# Patient Record
Sex: MALE | Race: WHITE | NOT HISPANIC OR LATINO | URBAN - METROPOLITAN AREA
[De-identification: names, ages, dates, MRNs, and addresses within clinical notes are randomized per-mention and may not be internally consistent; named-entity substitution may affect disease eponyms.]

---

## 2018-04-25 ENCOUNTER — EMERGENCY (EMERGENCY)
Facility: HOSPITAL | Age: 43
LOS: 1 days | Discharge: HOME | End: 2018-04-25
Attending: EMERGENCY MEDICINE
Payer: COMMERCIAL

## 2018-04-25 VITALS — HEIGHT: 74 IN | WEIGHT: 229.94 LBS

## 2018-04-25 VITALS
DIASTOLIC BLOOD PRESSURE: 90 MMHG | OXYGEN SATURATION: 96 % | RESPIRATION RATE: 16 BRPM | TEMPERATURE: 98 F | HEART RATE: 74 BPM | SYSTOLIC BLOOD PRESSURE: 171 MMHG

## 2018-04-25 DIAGNOSIS — Z98.890 OTHER SPECIFIED POSTPROCEDURAL STATES: Chronic | ICD-10-CM

## 2018-04-25 PROCEDURE — 72131 CT LUMBAR SPINE W/O DYE: CPT

## 2018-04-25 PROCEDURE — 99284 EMERGENCY DEPT VISIT MOD MDM: CPT

## 2018-04-25 PROCEDURE — 96372 THER/PROPH/DIAG INJ SC/IM: CPT

## 2018-04-25 PROCEDURE — 99284 EMERGENCY DEPT VISIT MOD MDM: CPT | Mod: 25

## 2018-04-25 PROCEDURE — 72131 CT LUMBAR SPINE W/O DYE: CPT | Mod: 26

## 2018-04-25 RX ORDER — METHOCARBAMOL 500 MG/1
2 TABLET, FILM COATED ORAL
Qty: 30 | Refills: 0 | OUTPATIENT
Start: 2018-04-25 | End: 2018-04-29

## 2018-04-25 RX ORDER — DIAZEPAM 5 MG
10 TABLET ORAL ONCE
Qty: 0 | Refills: 0 | Status: DISCONTINUED | OUTPATIENT
Start: 2018-04-25 | End: 2018-04-25

## 2018-04-25 RX ORDER — KETOROLAC TROMETHAMINE 30 MG/ML
30 SYRINGE (ML) INJECTION ONCE
Qty: 0 | Refills: 0 | Status: DISCONTINUED | OUTPATIENT
Start: 2018-04-25 | End: 2018-04-25

## 2018-04-25 RX ORDER — IBUPROFEN 200 MG
600 TABLET ORAL ONCE
Qty: 0 | Refills: 0 | Status: COMPLETED | OUTPATIENT
Start: 2018-04-25 | End: 2018-04-25

## 2018-04-25 RX ORDER — METHOCARBAMOL 500 MG/1
1500 TABLET, FILM COATED ORAL ONCE
Qty: 0 | Refills: 0 | Status: COMPLETED | OUTPATIENT
Start: 2018-04-25 | End: 2018-04-25

## 2018-04-25 RX ORDER — IBUPROFEN 200 MG
1 TABLET ORAL
Qty: 28 | Refills: 0 | OUTPATIENT
Start: 2018-04-25 | End: 2018-05-01

## 2018-04-25 RX ADMIN — METHOCARBAMOL 1500 MILLIGRAM(S): 500 TABLET, FILM COATED ORAL at 20:57

## 2018-04-25 RX ADMIN — Medication 600 MILLIGRAM(S): at 21:20

## 2018-04-25 RX ADMIN — Medication 30 MILLIGRAM(S): at 16:21

## 2018-04-25 RX ADMIN — Medication 600 MILLIGRAM(S): at 20:57

## 2018-04-25 RX ADMIN — Medication 10 MILLIGRAM(S): at 15:14

## 2018-04-25 RX ADMIN — Medication 30 MILLIGRAM(S): at 16:01

## 2018-04-25 NOTE — ED ADULT NURSE NOTE - PMH
Carcinoid tumor of abdomen    Chronic diarrhea    Inguinal hernia    Kidney stones    Ventral hernia

## 2018-04-25 NOTE — ED ADULT NURSE NOTE - OBJECTIVE STATEMENT
pt presents to ED with lower back pain s/p slip and fall yesterday. pt states he had PET scan yesterday which laying flat aggregated pain. pt ambulates with pain,. pt states it is painful to sit down. a&ox3. denies hitting his head. denies LOC.

## 2018-04-30 NOTE — ED PROVIDER NOTE - NSCAREINITIATED _GEN_ER
Pt ambulated around department using ambulatory pulse ox  Pt O2 saturation remained 94-96% on room air        Alana Shukla RN  03/22/18 [id number] Christopher Bautista(Attending)

## 2018-04-30 NOTE — ED PROVIDER NOTE - OBJECTIVE STATEMENT
41 yo male pmh carcinoid tumor comes to ed with lower back pain after a fall 4 days ago ; pt noted having recent mri pet scan for cancer; however he did not receive results; pt noted continued pain in lower back; denies urinary for fecal incontinence

## 2018-05-01 ENCOUNTER — OUTPATIENT (OUTPATIENT)
Dept: OUTPATIENT SERVICES | Facility: HOSPITAL | Age: 43
LOS: 1 days | End: 2018-05-01
Payer: MEDICAID

## 2018-05-01 DIAGNOSIS — Z98.890 OTHER SPECIFIED POSTPROCEDURAL STATES: Chronic | ICD-10-CM

## 2018-05-01 PROCEDURE — G9001: CPT

## 2018-05-09 DIAGNOSIS — R69 ILLNESS, UNSPECIFIED: ICD-10-CM

## 2018-10-05 ENCOUNTER — OUTPATIENT (OUTPATIENT)
Dept: OUTPATIENT SERVICES | Facility: HOSPITAL | Age: 43
LOS: 1 days | End: 2018-10-05

## 2018-10-05 DIAGNOSIS — Z98.890 OTHER SPECIFIED POSTPROCEDURAL STATES: Chronic | ICD-10-CM

## 2018-10-15 ENCOUNTER — OUTPATIENT (OUTPATIENT)
Dept: OUTPATIENT SERVICES | Facility: HOSPITAL | Age: 43
LOS: 1 days | End: 2018-10-15

## 2018-10-15 DIAGNOSIS — Z98.890 OTHER SPECIFIED POSTPROCEDURAL STATES: Chronic | ICD-10-CM

## 2018-10-20 ENCOUNTER — EMERGENCY (EMERGENCY)
Facility: HOSPITAL | Age: 43
LOS: 1 days | End: 2018-10-20
Payer: MEDICAID

## 2018-10-20 DIAGNOSIS — Z98.890 OTHER SPECIFIED POSTPROCEDURAL STATES: Chronic | ICD-10-CM

## 2018-10-20 PROCEDURE — 74019 RADEX ABDOMEN 2 VIEWS: CPT | Mod: 26

## 2018-10-20 PROCEDURE — 99285 EMERGENCY DEPT VISIT HI MDM: CPT

## 2018-10-20 PROCEDURE — 71046 X-RAY EXAM CHEST 2 VIEWS: CPT | Mod: 26

## 2018-10-21 PROCEDURE — 74177 CT ABD & PELVIS W/CONTRAST: CPT | Mod: 26

## 2018-10-21 PROCEDURE — 74018 RADEX ABDOMEN 1 VIEW: CPT | Mod: 26

## 2020-10-14 ENCOUNTER — APPOINTMENT (OUTPATIENT)
Dept: FAMILY MEDICINE | Facility: CLINIC | Age: 45
End: 2020-10-14
Payer: MEDICAID

## 2020-10-14 ENCOUNTER — NON-APPOINTMENT (OUTPATIENT)
Age: 45
End: 2020-10-14

## 2020-10-14 VITALS
BODY MASS INDEX: 30.93 KG/M2 | TEMPERATURE: 98.2 F | HEART RATE: 58 BPM | OXYGEN SATURATION: 99 % | DIASTOLIC BLOOD PRESSURE: 80 MMHG | WEIGHT: 241 LBS | SYSTOLIC BLOOD PRESSURE: 126 MMHG | HEIGHT: 74 IN

## 2020-10-14 DIAGNOSIS — Z80.8 FAMILY HISTORY OF MALIGNANT NEOPLASM OF OTHER ORGANS OR SYSTEMS: ICD-10-CM

## 2020-10-14 DIAGNOSIS — Z87.891 PERSONAL HISTORY OF NICOTINE DEPENDENCE: ICD-10-CM

## 2020-10-14 DIAGNOSIS — Z80.0 FAMILY HISTORY OF MALIGNANT NEOPLASM OF DIGESTIVE ORGANS: ICD-10-CM

## 2020-10-14 DIAGNOSIS — Z00.00 ENCOUNTER FOR GENERAL ADULT MEDICAL EXAMINATION W/OUT ABNORMAL FINDINGS: ICD-10-CM

## 2020-10-14 DIAGNOSIS — Z78.9 OTHER SPECIFIED HEALTH STATUS: ICD-10-CM

## 2020-10-14 PROCEDURE — 93000 ELECTROCARDIOGRAM COMPLETE: CPT

## 2020-10-14 PROCEDURE — 36415 COLL VENOUS BLD VENIPUNCTURE: CPT

## 2020-10-14 PROCEDURE — 99204 OFFICE O/P NEW MOD 45 MIN: CPT | Mod: 25

## 2020-10-14 NOTE — PLAN
[FreeTextEntry1] : 1. Labs\par 2. Referrals for pain management Fans and PMNR Del Cid\par 3 Follow up plan pending results of labs

## 2020-10-14 NOTE — PHYSICAL EXAM
[No Acute Distress] : no acute distress [Well Nourished] : well nourished [Well Developed] : well developed [Normal Sclera/Conjunctiva] : normal sclera/conjunctiva [EOMI] : extraocular movements intact [Normal Outer Ear/Nose] : the outer ears and nose were normal in appearance [No Lymphadenopathy] : no lymphadenopathy [No Respiratory Distress] : no respiratory distress  [Supple] : supple [Clear to Auscultation] : lungs were clear to auscultation bilaterally [No Accessory Muscle Use] : no accessory muscle use [Regular Rhythm] : with a regular rhythm [Normal Rate] : normal rate  [Normal S1, S2] : normal S1 and S2 [Pedal Pulses Present] : the pedal pulses are present [No Edema] : there was no peripheral edema [Soft] : abdomen soft [Non-distended] : non-distended [Non Tender] : non-tender [No HSM] : no HSM [No Masses] : no abdominal mass palpated [No CVA Tenderness] : no CVA  tenderness [Normal Bowel Sounds] : normal bowel sounds [No Spinal Tenderness] : no spinal tenderness [No Joint Swelling] : no joint swelling [Grossly Normal Strength/Tone] : grossly normal strength/tone [Coordination Grossly Intact] : coordination grossly intact [No Focal Deficits] : no focal deficits [Normal Gait] : normal gait [Normal Insight/Judgement] : insight and judgment were intact [Normal Affect] : the affect was normal [de-identified] : larger vertical scar [de-identified] : left axilla with multiple skin tags

## 2020-10-14 NOTE — HEALTH RISK ASSESSMENT
[] : Yes [16-20] : 16-20 [No] : No [1 or 2 (0 pts)] : 1 or 2 (0 points) [Never (0 pts)] : Never (0 points) [Yes] : In the past 12 months have you used drugs other than those required for medical reasons? Yes [No falls in past year] : Patient reported no falls in the past year [0] : 2) Feeling down, depressed, or hopeless: Not at all (0) [No Retinopathy] : No retinopathy [With Family] : lives with family [Significant Other] : lives with significant other [# Of Children ___] : has [unfilled] children [Fully functional (bathing, dressing, toileting, transferring, walking, feeding)] : Fully functional (bathing, dressing, toileting, transferring, walking, feeding) [Fully functional (using the telephone, shopping, preparing meals, housekeeping, doing laundry, using] : Fully functional and needs no help or supervision to perform IADLs (using the telephone, shopping, preparing meals, housekeeping, doing laundry, using transportation, managing medications and managing finances) [With Patient/Caregiver] : With Patient/Caregiver [Name: ___] : Health Care Proxy's Name: [unfilled]  [Relationship: ___] : Relationship: [unfilled] [de-identified] : Currently uses Vibe pen [de-identified] : Oncology [FreeTextEntry1] : Pain [EyeExamDate] : 10/2019 [de-identified] : No beef, pork, sugar; drinks only water [de-identified] : Marijuana; edibles [ColonoscopyDate] : 01/2017 [ColonoscopyComments] : Patient to f/u every 6 months [AdvancecareDate] : 10/2020

## 2020-10-16 PROBLEM — Z00.00 ENCOUNTER FOR PREVENTIVE HEALTH EXAMINATION: Status: ACTIVE | Noted: 2020-09-22

## 2020-10-26 LAB
ALBUMIN SERPL ELPH-MCNC: 4.9 G/DL
ALP BLD-CCNC: 77 U/L
ALT SERPL-CCNC: 86 U/L
ANION GAP SERPL CALC-SCNC: 11 MMOL/L
AST SERPL-CCNC: 37 U/L
BASOPHILS # BLD AUTO: 0.03 K/UL
BASOPHILS NFR BLD AUTO: 0.5 %
BILIRUB SERPL-MCNC: 0.6 MG/DL
BUN SERPL-MCNC: 16 MG/DL
CALCIUM SERPL-MCNC: 10.2 MG/DL
CHLORIDE SERPL-SCNC: 104 MMOL/L
CHOLEST SERPL-MCNC: 146 MG/DL
CHOLEST/HDLC SERPL: 3.6 RATIO
CO2 SERPL-SCNC: 23 MMOL/L
CREAT SERPL-MCNC: 0.96 MG/DL
EOSINOPHIL # BLD AUTO: 0.08 K/UL
EOSINOPHIL NFR BLD AUTO: 1.3 %
ESTIMATED AVERAGE GLUCOSE: 120 MG/DL
GLUCOSE SERPL-MCNC: 102 MG/DL
HBA1C MFR BLD HPLC: 5.8 %
HCT VFR BLD CALC: 49.3 %
HDLC SERPL-MCNC: 41 MG/DL
HGB BLD-MCNC: 16.1 G/DL
IMM GRANULOCYTES NFR BLD AUTO: 0.8 %
LDLC SERPL CALC-MCNC: 45 MG/DL
LYMPHOCYTES # BLD AUTO: 1.95 K/UL
LYMPHOCYTES NFR BLD AUTO: 32.2 %
MAN DIFF?: NORMAL
MCHC RBC-ENTMCNC: 29.3 PG
MCHC RBC-ENTMCNC: 32.7 GM/DL
MCV RBC AUTO: 89.8 FL
MONOCYTES # BLD AUTO: 0.61 K/UL
MONOCYTES NFR BLD AUTO: 10.1 %
NEUTROPHILS # BLD AUTO: 3.34 K/UL
NEUTROPHILS NFR BLD AUTO: 55.1 %
PLATELET # BLD AUTO: 204 K/UL
POTASSIUM SERPL-SCNC: 4.7 MMOL/L
PROT SERPL-MCNC: 7.5 G/DL
RBC # BLD: 5.49 M/UL
RBC # FLD: 12.6 %
SODIUM SERPL-SCNC: 138 MMOL/L
TRIGL SERPL-MCNC: 302 MG/DL
TSH SERPL-ACNC: 2.44 UIU/ML
WBC # FLD AUTO: 6.06 K/UL

## 2020-12-30 ENCOUNTER — APPOINTMENT (OUTPATIENT)
Dept: DERMATOLOGY | Facility: CLINIC | Age: 45
End: 2020-12-30
Payer: MEDICAID

## 2020-12-30 VITALS — WEIGHT: 240 LBS | HEIGHT: 74 IN | BODY MASS INDEX: 30.8 KG/M2

## 2020-12-30 DIAGNOSIS — L72.0 EPIDERMAL CYST: ICD-10-CM

## 2020-12-30 PROCEDURE — 99202 OFFICE O/P NEW SF 15 MIN: CPT

## 2020-12-30 PROCEDURE — 99072 ADDL SUPL MATRL&STAF TM PHE: CPT

## 2020-12-30 NOTE — HISTORY OF PRESENT ILLNESS
[FreeTextEntry1] : Growth on left eyelid [de-identified] : First visit for residual white male residing with a tender lesion near the left eye.\par Note: Patient has history of metastatic carcinoid- currently being treated at [facility].

## 2020-12-30 NOTE — PHYSICAL EXAM
[Alert] : alert [Oriented x 3] : ~L oriented x 3 [Well Nourished] : well nourished [FreeTextEntry3] : Type II skin\par \par Left upper outer eyelid, 2 mm white papule

## 2021-01-21 ENCOUNTER — APPOINTMENT (OUTPATIENT)
Dept: DERMATOLOGY | Facility: CLINIC | Age: 46
End: 2021-01-21

## 2021-02-27 ENCOUNTER — TRANSCRIPTION ENCOUNTER (OUTPATIENT)
Age: 46
End: 2021-02-27

## 2021-03-01 ENCOUNTER — APPOINTMENT (OUTPATIENT)
Dept: FAMILY MEDICINE | Facility: CLINIC | Age: 46
End: 2021-03-01
Payer: MEDICAID

## 2021-03-01 DIAGNOSIS — Z13.6 ENCOUNTER FOR SCREENING FOR CARDIOVASCULAR DISORDERS: ICD-10-CM

## 2021-03-01 DIAGNOSIS — U07.1 COVID-19: ICD-10-CM

## 2021-03-01 DIAGNOSIS — Z76.89 PERSONS ENCOUNTERING HEALTH SERVICES IN OTHER SPECIFIED CIRCUMSTANCES: ICD-10-CM

## 2021-03-01 PROCEDURE — 99213 OFFICE O/P EST LOW 20 MIN: CPT | Mod: 95

## 2021-03-01 NOTE — PHYSICAL EXAM
[de-identified] : Telehealth precludes traditional, comprehensive physical exam. Patient appeared stable and alert.

## 2021-03-01 NOTE — HISTORY OF PRESENT ILLNESS
[Home] : at home, [unfilled] , at the time of the visit. [Medical Office: ([facility])___] : at the medical office located in  [Verbal consent obtained from patient] : the patient, [unfilled] [FreeTextEntry8] : Mr. MCKENZIE WAN is a 45 year male who presents for telehealth appointment with + Covid-19. He  had been evaluated at Urgent Care 2/27/21 and rapid test +. Pt states symptoms are subsiding, had fever, body ache, chills and decreased appetite.  Pt now with c/o tickle to throat and occasional dry cough. Son is sick with Covid-19 as well. Patient would like to discuss zinc supplement and other measures for Covid-19 treatment. Patient also enquiring when he can get another covid pcr to travel to [place].  He has been having occasional LLQ pain, 4/10 on pain scale, lasting 20 seconds, no associated GI symptoms.  He does have LUTS likely from BPH.

## 2021-03-25 ENCOUNTER — APPOINTMENT (OUTPATIENT)
Dept: FAMILY MEDICINE | Facility: CLINIC | Age: 46
End: 2021-03-25
Payer: MEDICAID

## 2021-03-25 VITALS
DIASTOLIC BLOOD PRESSURE: 84 MMHG | OXYGEN SATURATION: 98 % | BODY MASS INDEX: 31.36 KG/M2 | HEART RATE: 68 BPM | WEIGHT: 244.38 LBS | TEMPERATURE: 96.4 F | SYSTOLIC BLOOD PRESSURE: 138 MMHG | HEIGHT: 74 IN

## 2021-03-25 DIAGNOSIS — M25.562 PAIN IN RIGHT KNEE: ICD-10-CM

## 2021-03-25 DIAGNOSIS — E78.1 PURE HYPERGLYCERIDEMIA: ICD-10-CM

## 2021-03-25 DIAGNOSIS — R74.8 ABNORMAL LEVELS OF OTHER SERUM ENZYMES: ICD-10-CM

## 2021-03-25 DIAGNOSIS — M25.561 PAIN IN RIGHT KNEE: ICD-10-CM

## 2021-03-25 DIAGNOSIS — G89.29 PAIN IN RIGHT KNEE: ICD-10-CM

## 2021-03-25 PROCEDURE — 99072 ADDL SUPL MATRL&STAF TM PHE: CPT

## 2021-03-25 PROCEDURE — 99214 OFFICE O/P EST MOD 30 MIN: CPT

## 2021-03-26 NOTE — HISTORY OF PRESENT ILLNESS
[FreeTextEntry1] : knee pain [de-identified] : Mr. MCKENZIE WAN is a 45 year male who presents to office following up on chronic, bilateral knee pain, worsening.\par Patient also had trauma to his right hand when he was younger and is having increasing difficulty with grasping, especially due to right ring and pinky finger, and wrist discomfort.\par He was also meant to have a dental exam last night but did not make an apt due to a loss in his family.\par He also reports right-sided back/flank pain.\par Patient is currently being treated with hormone therapy for carcinoid tumor. He complains of body aches and pains.

## 2021-03-26 NOTE — PHYSICAL EXAM
[Normal] : no acute distress, well nourished, well developed and well-appearing [Normal Sclera/Conjunctiva] : normal sclera/conjunctiva [Normal Outer Ear/Nose] : the outer ears and nose were normal in appearance [Supple] : supple [Normal Gait] : normal gait [Normal Affect] : the affect was normal [de-identified] : lumbar back with paraspinal tenderness

## 2021-03-30 ENCOUNTER — APPOINTMENT (OUTPATIENT)
Dept: FAMILY MEDICINE | Facility: CLINIC | Age: 46
End: 2021-03-30

## 2021-03-30 DIAGNOSIS — R41.3 OTHER AMNESIA: ICD-10-CM

## 2021-04-15 ENCOUNTER — APPOINTMENT (OUTPATIENT)
Dept: ORTHOPEDIC SURGERY | Facility: CLINIC | Age: 46
End: 2021-04-15

## 2021-05-05 ENCOUNTER — LABORATORY RESULT (OUTPATIENT)
Age: 46
End: 2021-05-05

## 2021-05-05 ENCOUNTER — APPOINTMENT (OUTPATIENT)
Dept: FAMILY MEDICINE | Facility: CLINIC | Age: 46
End: 2021-05-05
Payer: MEDICAID

## 2021-05-05 ENCOUNTER — APPOINTMENT (OUTPATIENT)
Dept: ORTHOPEDIC SURGERY | Facility: CLINIC | Age: 46
End: 2021-05-05

## 2021-05-05 VITALS
DIASTOLIC BLOOD PRESSURE: 84 MMHG | HEART RATE: 71 BPM | BODY MASS INDEX: 30.81 KG/M2 | TEMPERATURE: 98 F | SYSTOLIC BLOOD PRESSURE: 130 MMHG | WEIGHT: 240 LBS | OXYGEN SATURATION: 98 %

## 2021-05-05 PROCEDURE — 99213 OFFICE O/P EST LOW 20 MIN: CPT | Mod: 25

## 2021-05-05 PROCEDURE — 36415 COLL VENOUS BLD VENIPUNCTURE: CPT

## 2021-05-05 PROCEDURE — 99072 ADDL SUPL MATRL&STAF TM PHE: CPT

## 2021-05-06 LAB — B BURGDOR IGG+IGM SER QL IB: NORMAL

## 2021-05-06 NOTE — PLAN
[FreeTextEntry1] : Baseline serologic testing, though no antibodies expected as it has been less than 1 week since exposure.

## 2021-05-06 NOTE — HISTORY OF PRESENT ILLNESS
[FreeTextEntry8] : Mr. MCKENZIE WAN is a 45 year male who presents to office c/o a tick bite on neck. Patient reports he noticed the bite about 2 days ago and he estimates tick was attached for at leas 2 days, based on days when he was outside. He removed tick but has significant swelling and redness.  Pt also has a lump on the side of the next that appeared last night.

## 2021-05-14 LAB

## 2021-05-17 ENCOUNTER — APPOINTMENT (OUTPATIENT)
Dept: FAMILY MEDICINE | Facility: CLINIC | Age: 46
End: 2021-05-17

## 2021-05-19 ENCOUNTER — NON-APPOINTMENT (OUTPATIENT)
Age: 46
End: 2021-05-19

## 2021-05-26 ENCOUNTER — APPOINTMENT (OUTPATIENT)
Dept: NEUROLOGY | Facility: CLINIC | Age: 46
End: 2021-05-26

## 2021-06-22 ENCOUNTER — APPOINTMENT (OUTPATIENT)
Dept: FAMILY MEDICINE | Facility: CLINIC | Age: 46
End: 2021-06-22
Payer: MEDICAID

## 2021-06-22 VITALS
HEIGHT: 74 IN | SYSTOLIC BLOOD PRESSURE: 134 MMHG | DIASTOLIC BLOOD PRESSURE: 84 MMHG | WEIGHT: 243 LBS | HEART RATE: 68 BPM | OXYGEN SATURATION: 98 % | BODY MASS INDEX: 31.18 KG/M2

## 2021-06-22 PROCEDURE — 99213 OFFICE O/P EST LOW 20 MIN: CPT

## 2021-06-22 RX ORDER — DOXYCYCLINE HYCLATE 100 MG/1
100 CAPSULE ORAL TWICE DAILY
Qty: 20 | Refills: 0 | Status: DISCONTINUED | COMMUNITY
Start: 2021-05-05 | End: 2021-06-22

## 2021-06-24 NOTE — PHYSICAL EXAM
[Normal] : no acute distress, well nourished, well developed and well-appearing [Normal Sclera/Conjunctiva] : normal sclera/conjunctiva [Normal Outer Ear/Nose] : the outer ears and nose were normal in appearance [Supple] : supple [No Respiratory Distress] : no respiratory distress  [No Accessory Muscle Use] : no accessory muscle use [Normal Rate] : normal rate  [Pedal Pulses Present] : the pedal pulses are present [No Edema] : there was no peripheral edema [No Rash] : no rash [Normal Gait] : normal gait [Normal Affect] : the affect was normal

## 2021-06-24 NOTE — HISTORY OF PRESENT ILLNESS
[FreeTextEntry1] : Pt is following up on chronic pain\par Pt would also like to discuss the COVID vaccine [de-identified] : Mr. MCKENZIE WAN is a 45 year male who presents to office to follow up on chronic lower back pain from liver cancer mets. Patient states cocaine was found in his urine and hence physician will no longer be prescribing him with opioid pain medicines. Patient states he has a few tablets left.

## 2021-06-24 NOTE — PLAN
[FreeTextEntry1] : Discussed with patient, referral to Palliative medicine for comprehensive management of pain and other associated symptoms. Patient agreed. Also educated patient importance of abstaining from recreational drug use or self medication with alcohol.

## 2021-07-22 ENCOUNTER — APPOINTMENT (OUTPATIENT)
Dept: GASTROENTEROLOGY | Facility: CLINIC | Age: 46
End: 2021-07-22

## 2021-08-13 ENCOUNTER — APPOINTMENT (OUTPATIENT)
Dept: FAMILY MEDICINE | Facility: CLINIC | Age: 46
End: 2021-08-13
Payer: MEDICAID

## 2021-08-13 VITALS
WEIGHT: 243 LBS | SYSTOLIC BLOOD PRESSURE: 131 MMHG | HEIGHT: 74 IN | TEMPERATURE: 97.1 F | BODY MASS INDEX: 31.18 KG/M2 | HEART RATE: 61 BPM | DIASTOLIC BLOOD PRESSURE: 86 MMHG

## 2021-08-13 DIAGNOSIS — M79.602 PAIN IN LEFT ARM: ICD-10-CM

## 2021-08-13 DIAGNOSIS — S62.92XA UNSPECIFIED FRACTURE OF LEFT WRIST AND HAND, INITIAL ENCOUNTER FOR CLOSED FRACTURE: ICD-10-CM

## 2021-08-13 PROCEDURE — 99214 OFFICE O/P EST MOD 30 MIN: CPT

## 2021-08-13 NOTE — PHYSICAL EXAM
[No Acute Distress] : no acute distress [Well Nourished] : well nourished [Well Developed] : well developed [Normal] : normal rate, regular rhythm, normal S1 and S2 and no murmur heard [de-identified] : Right hand casted. Pateint able to move his fingers. No alteration to sensation. No tenderness noted in fingers or elbow.

## 2021-08-13 NOTE — ASSESSMENT
[FreeTextEntry1] : \par Left wrist fracture\par Unclear cause but could be related to mets as patient report that has Stage IV cancer. [facility]\par Patient drove from [place] for 16 hrs, he could have had microtrauma.\par Patient didn't bring his hospital records\par Advised regarding arm elevation, to apply ice\par WIll try ibuprofen with meals, sending to his pharmacy\par Referring to hand surgery, advised to bring his records to them\par \par Return to care: for his regular follow ups or earlier if needed\par Call or return for any questions

## 2021-08-18 ENCOUNTER — APPOINTMENT (OUTPATIENT)
Dept: ORTHOPEDIC SURGERY | Facility: CLINIC | Age: 46
End: 2021-08-18
Payer: MEDICAID

## 2021-08-18 VITALS
SYSTOLIC BLOOD PRESSURE: 131 MMHG | DIASTOLIC BLOOD PRESSURE: 84 MMHG | HEIGHT: 74 IN | BODY MASS INDEX: 31.18 KG/M2 | WEIGHT: 243 LBS | HEART RATE: 70 BPM

## 2021-08-18 DIAGNOSIS — M79.642 PAIN IN LEFT HAND: ICD-10-CM

## 2021-08-18 DIAGNOSIS — M19.041 PRIMARY OSTEOARTHRITIS, RIGHT HAND: ICD-10-CM

## 2021-08-18 PROCEDURE — 73130 X-RAY EXAM OF HAND: CPT | Mod: LT

## 2021-08-18 PROCEDURE — 99204 OFFICE O/P NEW MOD 45 MIN: CPT

## 2021-08-19 ENCOUNTER — APPOINTMENT (OUTPATIENT)
Dept: FAMILY MEDICINE | Facility: CLINIC | Age: 46
End: 2021-08-19
Payer: MEDICAID

## 2021-08-19 VITALS
WEIGHT: 240 LBS | HEART RATE: 60 BPM | SYSTOLIC BLOOD PRESSURE: 122 MMHG | OXYGEN SATURATION: 99 % | RESPIRATION RATE: 16 BRPM | BODY MASS INDEX: 30.8 KG/M2 | DIASTOLIC BLOOD PRESSURE: 60 MMHG | HEIGHT: 74 IN

## 2021-08-19 DIAGNOSIS — L91.8 OTHER HYPERTROPHIC DISORDERS OF THE SKIN: ICD-10-CM

## 2021-08-19 PROCEDURE — 99213 OFFICE O/P EST LOW 20 MIN: CPT

## 2021-08-19 RX ORDER — OXYCODONE 5 MG/1
5 TABLET ORAL
Qty: 12 | Refills: 0 | Status: DISCONTINUED | COMMUNITY
Start: 2021-08-11 | End: 2021-08-19

## 2021-08-19 RX ORDER — NAPROXEN 500 MG/1
500 TABLET ORAL
Qty: 30 | Refills: 0 | Status: DISCONTINUED | COMMUNITY
Start: 2021-06-22 | End: 2021-08-19

## 2021-08-19 RX ORDER — IBUPROFEN 600 MG/1
600 TABLET, FILM COATED ORAL 3 TIMES DAILY
Qty: 12 | Refills: 0 | Status: DISCONTINUED | COMMUNITY
Start: 2021-08-13 | End: 2021-08-19

## 2021-08-19 RX ORDER — DICLOFENAC SODIUM 1% 10 MG/G
1 GEL TOPICAL DAILY
Qty: 1 | Refills: 0 | Status: DISCONTINUED | COMMUNITY
Start: 2021-06-22 | End: 2021-08-19

## 2021-08-19 NOTE — HISTORY OF PRESENT ILLNESS
[FreeTextEntry1] : pt f/u for stage 4 liver cancer. pt is concerned about skin tags that have been forming on his torso. He also would like a referral to a different pain management doctor.   [de-identified] : 45 yo male, hx of carcinoid tumor of liver with mets s/p resection of colon and now with spread to spine as per patient, who presents today for follow up. \par He says he has multiple skin tags and bumps on his body. \par Has seen dermatology in past. \par Says that the tags on his body have been present for several years. \par Wishes to have them evaluated by derm and needs referral. \par \par Was seeing pain management on oxycodone 20s that was decreased. \par Was given ibuprofen and naproxen that has not helped. \par Difficulty with sleeping. Is in pain still. \par He is planning to see another pain management doctor instead. \par He says he took extra doses of 20mg previously and that it was not in urine and he was stopped by them. \par \par He follows with [facility] for monthly shot of "octreotide" for the tumor ?carcinoid tumor"\par \par

## 2021-08-19 NOTE — ASSESSMENT
[FreeTextEntry1] : Carcinoid tumor with mets associated with chronic back pain and cancer associated pain\par Istop Ref: [id number]\par was previously seeing pain management but was discharged from opioids after having tested negative for opioids in urine because patient says he finished his supply sooner because if increased pain demands. \par he was last dispensed a 3 day course of oxycodone 5mg tab\par I advised patient that he cannot violate pain contracts with pain management. \par I would be willing to provide a ONE TIME script for oxycodone 5, although pain says he was taking oxycodone 20 with adequate pain control. I advised him that I am not comfortable continuing a patient on high dose opioids who have been discharged from a pain practice. \par I gave him referral to assist in seeking a new pain management practice and advised him to not take pain meds ahead of schedule. \par He agreed with 5 day supply of oxy 5 IR q6h prn breakthrough pain. \par \par Skin tags\par derm consult from dec 2020 reviewed\par referral given to patient \par \par Patient agrees with plan, all further questions answered during encounter.\par

## 2021-08-19 NOTE — PHYSICAL EXAM
[No Acute Distress] : no acute distress [Well Nourished] : well nourished [No Lymphadenopathy] : no lymphadenopathy [Supple] : supple [Normal] : normal rate, regular rhythm, normal S1 and S2 and no murmur heard [Soft] : abdomen soft [Non Tender] : non-tender [Normal Bowel Sounds] : normal bowel sounds [No CVA Tenderness] : no CVA  tenderness [Grossly Normal Strength/Tone] : grossly normal strength/tone [Coordination Grossly Intact] : coordination grossly intact [No Focal Deficits] : no focal deficits [Normal Gait] : normal gait [de-identified] : midline abdominal scar [de-identified] : lumbar midline spinal tenderness noted.  [de-identified] : axillary skin tags

## 2021-09-16 ENCOUNTER — TRANSCRIPTION ENCOUNTER (OUTPATIENT)
Age: 46
End: 2021-09-16

## 2021-09-22 ENCOUNTER — TRANSCRIPTION ENCOUNTER (OUTPATIENT)
Age: 46
End: 2021-09-22

## 2021-11-04 ENCOUNTER — APPOINTMENT (OUTPATIENT)
Dept: DERMATOLOGY | Facility: CLINIC | Age: 46
End: 2021-11-04

## 2021-11-09 ENCOUNTER — APPOINTMENT (OUTPATIENT)
Dept: DERMATOLOGY | Facility: CLINIC | Age: 46
End: 2021-11-09

## 2021-11-23 ENCOUNTER — APPOINTMENT (OUTPATIENT)
Dept: FAMILY MEDICINE | Facility: CLINIC | Age: 46
End: 2021-11-23
Payer: MEDICAID

## 2021-11-23 PROCEDURE — 99443: CPT

## 2021-11-23 RX ORDER — OXYCODONE 5 MG/1
5 TABLET ORAL EVERY 6 HOURS
Qty: 20 | Refills: 0 | Status: DISCONTINUED | COMMUNITY
Start: 2021-08-19 | End: 2021-11-23

## 2021-11-23 NOTE — HISTORY OF PRESENT ILLNESS
[FreeTextEntry1] : Pt c/o of productive cough with green mucus and nasal congestion x 1 week. Pt is also having extreme pain in tailbone when laying down or sitting due to tumor in area. Pt has trouble sleeping due to pain. Pt was tested negative two weeks ago.  [de-identified] : Mr. MCKENZIE WAN presents for a telephonic appointment c/o of nasal congestion, which initially had green discharge, now it is clear, No fever or chills. Patient has memory issues since being diagnosed with cancer, relies on wife to get to appointments, but patient states that is not going too well.  He is also complaining of severe pain on tailbone due to likely mets for which he is planned for radiation therapy. Patient is not seeing palliative medicine  and is agreeable to a referral.

## 2021-11-23 NOTE — PLAN
[FreeTextEntry1] : Discussed with patient, need to get under care of palliative care specialist to help with managing symptoms. Patient agreeable.

## 2021-12-07 ENCOUNTER — APPOINTMENT (OUTPATIENT)
Dept: FAMILY MEDICINE | Facility: CLINIC | Age: 46
End: 2021-12-07
Payer: MEDICAID

## 2021-12-07 DIAGNOSIS — J06.9 ACUTE UPPER RESPIRATORY INFECTION, UNSPECIFIED: ICD-10-CM

## 2021-12-07 PROCEDURE — 99442: CPT

## 2021-12-09 NOTE — PLAN
[FreeTextEntry1] : Will manage chronic cancer associated pain for now,, refer to palliative. Patient to follow up with [facility] and have notes sent to my office. \par \par Also explained to patient, would recommend he get tested and if positive can refer for mAB infusion as he is at risk of severe covid due to current cancer diagnosis. Patient declined.

## 2021-12-09 NOTE — PHYSICAL EXAM
[de-identified] : Telehealth precludes traditional, comprehensive physical exam. Patient appeared stable and alert.

## 2021-12-09 NOTE — HISTORY OF PRESENT ILLNESS
[Home] : at home, [unfilled] , at the time of the visit. [Medical Office: ([facility])___] : at the medical office located in  [Verbal consent obtained from patient] : the patient, [unfilled] [Spouse] : spouse [FreeTextEntry1] : Patient C/O body aches, nausea and runny nose x 3-4 days.\par Pt. states he is having severe bone pain. [de-identified] : Mr. MCKENZIE WAN presents for a telephonic appointment c/o severe tail bone pain form cancer mets. Patient has been unable to follow up with palliative care as discussed and is requesting management of pain.\par \par Patient also quarantining for possible covid exposure. Has had myalgia, cough and nasal congestion, no fever or chills. Had had one covid-19 vaccine dose, has not been tested and declines testing through our office.

## 2021-12-09 NOTE — END OF VISIT
[FreeTextEntry3] : start: 10:45 am\par end 11am [Time Spent: ___ minutes] : I have spent [unfilled] minutes of time on the encounter.

## 2021-12-15 ENCOUNTER — NON-APPOINTMENT (OUTPATIENT)
Age: 46
End: 2021-12-15

## 2021-12-17 ENCOUNTER — NON-APPOINTMENT (OUTPATIENT)
Age: 46
End: 2021-12-17

## 2022-01-07 ENCOUNTER — APPOINTMENT (OUTPATIENT)
Dept: FAMILY MEDICINE | Facility: CLINIC | Age: 47
End: 2022-01-07

## 2022-03-16 ENCOUNTER — APPOINTMENT (OUTPATIENT)
Dept: GASTROENTEROLOGY | Facility: CLINIC | Age: 47
End: 2022-03-16

## 2022-03-19 ENCOUNTER — TRANSCRIPTION ENCOUNTER (OUTPATIENT)
Age: 47
End: 2022-03-19

## 2022-03-26 ENCOUNTER — TRANSCRIPTION ENCOUNTER (OUTPATIENT)
Age: 47
End: 2022-03-26

## 2022-03-27 ENCOUNTER — TRANSCRIPTION ENCOUNTER (OUTPATIENT)
Age: 47
End: 2022-03-27

## 2022-03-29 ENCOUNTER — APPOINTMENT (OUTPATIENT)
Dept: FAMILY MEDICINE | Facility: CLINIC | Age: 47
End: 2022-03-29

## 2022-06-22 ENCOUNTER — TRANSCRIPTION ENCOUNTER (OUTPATIENT)
Age: 47
End: 2022-06-22

## 2022-06-22 ENCOUNTER — APPOINTMENT (OUTPATIENT)
Dept: FAMILY MEDICINE | Facility: CLINIC | Age: 47
End: 2022-06-22
Payer: MEDICAID

## 2022-06-22 VITALS
BODY MASS INDEX: 26.69 KG/M2 | SYSTOLIC BLOOD PRESSURE: 114 MMHG | DIASTOLIC BLOOD PRESSURE: 76 MMHG | HEART RATE: 67 BPM | OXYGEN SATURATION: 98 % | HEIGHT: 74 IN | WEIGHT: 208 LBS | TEMPERATURE: 97 F
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
## 2022-06-22 DIAGNOSIS — Z90.49 ACQUIRED ABSENCE OF OTHER SPECIFIED PARTS OF DIGESTIVE TRACT: ICD-10-CM

## 2022-06-22 DIAGNOSIS — A69.20 LYME DISEASE, UNSPECIFIED: ICD-10-CM

## 2022-06-22 PROCEDURE — 99215 OFFICE O/P EST HI 40 MIN: CPT

## 2022-06-23 PROBLEM — Z90.49 H/O HEMICOLECTOMY: Status: ACTIVE | Noted: 2022-06-23
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
## 2022-06-23 PROBLEM — A69.20 ERYTHEMA MIGRANS (LYME DISEASE): Status: RESOLVED | Noted: 2021-05-06 | Resolved: 2022-06-23

## 2022-06-23 RX ORDER — FENTANYL 12 UG/H
12 PATCH, EXTENDED RELEASE TRANSDERMAL
Qty: 2 | Refills: 0 | Status: DISCONTINUED | COMMUNITY
Start: 2022-06-07 | End: 2022-06-23

## 2022-06-23 RX ORDER — FENTANYL 25 UG/H
25 PATCH, EXTENDED RELEASE TRANSDERMAL
Qty: 5 | Refills: 0 | Status: DISCONTINUED | COMMUNITY
Start: 2022-05-27 | End: 2022-06-23

## 2022-06-23 RX ORDER — FENTANYL 50 UG/H
50 PATCH, EXTENDED RELEASE TRANSDERMAL
Qty: 3 | Refills: 0 | Status: DISCONTINUED | COMMUNITY
Start: 2022-04-27 | End: 2022-06-23

## 2022-06-23 NOTE — PHYSICAL EXAM
[Normal Outer Ear/Nose] : the outer ears and nose were normal in appearance [Supple] : supple [Normal] : no joint swelling and grossly normal strength and tone [Coordination Grossly Intact] : coordination grossly intact [No Focal Deficits] : no focal deficits [Normal Gait] : normal gait [Normal Affect] : the affect was normal [Normal Insight/Judgement] : insight and judgment were intact [de-identified] : RUE with midline [de-identified] : colostomy bag, with liquid brown contents

## 2022-06-23 NOTE — HISTORY OF PRESENT ILLNESS
[Post-hospitalization from ___ Hospital] : Post-hospitalization from [unfilled] Hospital [Admitted on: ___] : The patient was admitted on [unfilled] [Discharged on ___] : discharged on [unfilled] [FreeTextEntry2] : Patient was admitted to [facility] on 03/26/2022 for colonic mass, enterocutaneous fistula, and carcinoid tumor to liver and was discharged on 04/18/2022. Subsequently, patient was admitted to [facility] in [facility] (records not available) and discharged about 1 month ago. \par Patient currently has a colostomy bag, [facility] for TPN. He is pending follow up with pain management. He sees surgeon Dr. Powers, Oncologist Dr. Sam.\par He needs forms completed for transportation today. He is requesting pain medication refill until able to see Pain management. \par

## 2022-07-15 ENCOUNTER — APPOINTMENT (OUTPATIENT)
Dept: FAMILY MEDICINE | Facility: CLINIC | Age: 47
End: 2022-07-15

## 2022-07-15 PROCEDURE — 99442: CPT

## 2022-07-15 NOTE — REVIEW OF SYSTEMS
[Fever] : no fever [Chest Pain] : no chest pain [Shortness Of Breath] : no shortness of breath [FreeTextEntry9] : pain in tail bone area yazmin tkeeps him up at night

## 2022-07-15 NOTE — HISTORY OF PRESENT ILLNESS
[Home] : at home, [unfilled] , at the time of the visit. [Medical Office: ([facility])___] : at the medical office located in  [Verbal consent obtained from patient] : the patient, [unfilled] [FreeTextEntry8] : Patient is following up on pain management for colon carcinoma. pt has appt w dr webster  next week , pt has stage 4 colon cancer, pt notes went to office in [place] and only offered shots, pt notes was on fentanyl patch, whichh he felt was too strong \par  pt had surg  w dr post, as cancer  stasrted in intestines and then in colon, pt ran out  of pain med the other day, and pain is in tailbone , where appareently has stread to , pt takes one before bed , and notes he is unable to sleep without it

## 2022-07-20 ENCOUNTER — APPOINTMENT (OUTPATIENT)
Dept: FAMILY MEDICINE | Facility: CLINIC | Age: 47
End: 2022-07-20

## 2022-07-20 VITALS
SYSTOLIC BLOOD PRESSURE: 120 MMHG | OXYGEN SATURATION: 98 % | BODY MASS INDEX: 26.69 KG/M2 | DIASTOLIC BLOOD PRESSURE: 86 MMHG | TEMPERATURE: 97.2 F | HEIGHT: 74 IN | WEIGHT: 208 LBS | HEART RATE: 73 BPM

## 2022-07-20 PROCEDURE — 99214 OFFICE O/P EST MOD 30 MIN: CPT

## 2022-07-22 NOTE — HISTORY OF PRESENT ILLNESS
[FreeTextEntry1] : Patient is following up on pain management for colon carcinoma.  [de-identified] : Patient is in process if looking for pain management specialist to help with management of pain, in abdomen and tail bone due to metastatic cancer. No other complaints today.

## 2022-07-22 NOTE — PHYSICAL EXAM
[Normal Outer Ear/Nose] : the outer ears and nose were normal in appearance [Supple] : supple [Normal] : no joint swelling and grossly normal strength and tone [Coordination Grossly Intact] : coordination grossly intact [No Focal Deficits] : no focal deficits [Normal Gait] : normal gait [Normal Affect] : the affect was normal [Normal Insight/Judgement] : insight and judgment were intact [de-identified] : RUE with midline in place [de-identified] : colostomy bag, with liquid brown contents

## 2022-08-09 ENCOUNTER — APPOINTMENT (OUTPATIENT)
Dept: PHYSICAL MEDICINE AND REHAB | Facility: CLINIC | Age: 47
End: 2022-08-09

## 2022-08-11 ENCOUNTER — APPOINTMENT (OUTPATIENT)
Dept: FAMILY MEDICINE | Facility: CLINIC | Age: 47
End: 2022-08-11

## 2022-08-11 VITALS
HEIGHT: 74 IN | WEIGHT: 209 LBS | HEART RATE: 75 BPM | SYSTOLIC BLOOD PRESSURE: 132 MMHG | RESPIRATION RATE: 16 BRPM | DIASTOLIC BLOOD PRESSURE: 80 MMHG | BODY MASS INDEX: 26.82 KG/M2

## 2022-08-11 PROCEDURE — 99214 OFFICE O/P EST MOD 30 MIN: CPT

## 2022-08-11 RX ORDER — LOPERAMIDE HYDROCHLORIDE 2 MG/1
2 CAPSULE ORAL
Qty: 120 | Refills: 0 | Status: COMPLETED | COMMUNITY
Start: 2022-03-09 | End: 2022-08-11

## 2022-08-11 RX ORDER — BACITRACIN ZINC 500 [USP'U]/G
500 OINTMENT TOPICAL
Qty: 28 | Refills: 0 | Status: COMPLETED | COMMUNITY
Start: 2022-03-09 | End: 2022-08-11

## 2022-08-11 RX ORDER — NYSTATIN 100000 [USP'U]/G
100000 POWDER TOPICAL
Qty: 60 | Refills: 0 | Status: COMPLETED | COMMUNITY
Start: 2022-03-09 | End: 2022-08-11

## 2022-08-11 RX ORDER — FLUTICASONE PROPIONATE 50 UG/1
50 SPRAY, METERED NASAL TWICE DAILY
Qty: 1 | Refills: 0 | Status: COMPLETED | COMMUNITY
Start: 2021-11-23 | End: 2022-08-11

## 2022-08-11 RX ORDER — OLANZAPINE 10 MG/1
10 TABLET, ORALLY DISINTEGRATING ORAL
Qty: 10 | Refills: 0 | Status: COMPLETED | COMMUNITY
Start: 2022-03-09 | End: 2022-08-11

## 2022-08-11 RX ORDER — NALOXONE HYDROCHLORIDE 4 MG/.1ML
4 SPRAY NASAL
Qty: 2 | Refills: 0 | Status: COMPLETED | COMMUNITY
Start: 2022-04-27 | End: 2022-08-11

## 2022-08-11 RX ORDER — AMMONIUM LACTATE 12 %
12 CREAM (GRAM) TOPICAL
Qty: 140 | Refills: 0 | Status: COMPLETED | COMMUNITY
Start: 2022-03-09 | End: 2022-08-11

## 2022-08-11 RX ORDER — DIPHENOXYLATE HYDROCHLORIDE AND ATROPINE SULFATE 2.5; .025 MG/1; MG/1
2.5-0.025 TABLET ORAL
Qty: 240 | Refills: 0 | Status: COMPLETED | COMMUNITY
Start: 2022-03-09 | End: 2022-08-11

## 2022-08-14 NOTE — PHYSICAL EXAM
[Normal Outer Ear/Nose] : the outer ears and nose were normal in appearance [Supple] : supple [Normal] : no joint swelling and grossly normal strength and tone [Coordination Grossly Intact] : coordination grossly intact [No Focal Deficits] : no focal deficits [Normal Gait] : normal gait [Normal Affect] : the affect was normal [Normal Insight/Judgement] : insight and judgment were intact [de-identified] : RUE with midline in place [de-identified] : colostomy bag, with liquid brown contents

## 2022-08-14 NOTE — HISTORY OF PRESENT ILLNESS
[FreeTextEntry1] : Follow up chronic pain\par  [de-identified] : Patient is following up on chronic pain associated with metastatic cancer. He is requesting medication refills. He is awaiting call back from Palliative care for an appointment. Patient states he will be going for radiation therapy [place] [facility]par Patient needs paperwork for medical transportation form is needed.\par \par

## 2022-08-14 NOTE — PLAN
[FreeTextEntry1] : Patient advised to to send Lab results from TPN nurse and Consult/Treatment notes from [facility] to update out records\par

## 2022-09-14 ENCOUNTER — APPOINTMENT (OUTPATIENT)
Dept: GERIATRICS | Facility: CLINIC | Age: 47
End: 2022-09-14

## 2022-09-20 ENCOUNTER — APPOINTMENT (OUTPATIENT)
Dept: FAMILY MEDICINE | Facility: CLINIC | Age: 47
End: 2022-09-20

## 2022-09-20 VITALS
WEIGHT: 208 LBS | DIASTOLIC BLOOD PRESSURE: 68 MMHG | OXYGEN SATURATION: 99 % | HEART RATE: 89 BPM | TEMPERATURE: 97.1 F | HEIGHT: 74 IN | SYSTOLIC BLOOD PRESSURE: 108 MMHG | BODY MASS INDEX: 26.69 KG/M2

## 2022-09-20 DIAGNOSIS — Z29.9 ENCOUNTER FOR PROPHYLACTIC MEASURES, UNSPECIFIED: ICD-10-CM

## 2022-09-20 PROCEDURE — 99213 OFFICE O/P EST LOW 20 MIN: CPT

## 2022-09-23 NOTE — PHYSICAL EXAM
[Normal Outer Ear/Nose] : the outer ears and nose were normal in appearance [Supple] : supple [Normal] : no joint swelling and grossly normal strength and tone [Coordination Grossly Intact] : coordination grossly intact [No Focal Deficits] : no focal deficits [Normal Gait] : normal gait [Normal Affect] : the affect was normal [Normal Insight/Judgement] : insight and judgment were intact [de-identified] : RUE with midline in place [de-identified] : colostomy bag, with liquid brown contents

## 2022-09-23 NOTE — HISTORY OF PRESENT ILLNESS
[FreeTextEntry1] : Patient is following up on pain management. [de-identified] : Patient is stage 4 carcinoid tumor. Patient has pain in tailbone due to mets. Patient reports he is due to start radiation therapy. Patient will have notes forwarded to my office. \par Patient has had intermittent constipation, has colostomy bag in place.

## 2022-10-10 ENCOUNTER — NON-APPOINTMENT (OUTPATIENT)
Age: 47
End: 2022-10-10

## 2022-10-19 ENCOUNTER — APPOINTMENT (OUTPATIENT)
Dept: FAMILY MEDICINE | Facility: CLINIC | Age: 47
End: 2022-10-19

## 2022-10-19 VITALS
OXYGEN SATURATION: 100 % | BODY MASS INDEX: 26.5 KG/M2 | DIASTOLIC BLOOD PRESSURE: 70 MMHG | TEMPERATURE: 97.9 F | HEART RATE: 57 BPM | SYSTOLIC BLOOD PRESSURE: 108 MMHG | RESPIRATION RATE: 16 BRPM | WEIGHT: 206.5 LBS | HEIGHT: 74 IN

## 2022-10-19 DIAGNOSIS — Z78.9 OTHER SPECIFIED HEALTH STATUS: ICD-10-CM

## 2022-10-19 PROCEDURE — 99214 OFFICE O/P EST MOD 30 MIN: CPT

## 2022-10-19 RX ORDER — MUPIROCIN 20 MG/G
2 OINTMENT TOPICAL
Qty: 15 | Refills: 0 | Status: DISCONTINUED | COMMUNITY
Start: 2022-10-11 | End: 2022-10-19

## 2022-10-19 RX ORDER — SULFAMETHOXAZOLE AND TRIMETHOPRIM 800; 160 MG/1; MG/1
800-160 TABLET ORAL
Qty: 14 | Refills: 0 | Status: DISCONTINUED | COMMUNITY
Start: 2022-10-11 | End: 2022-10-19

## 2022-10-19 RX ORDER — CEPHALEXIN 500 MG/1
500 TABLET ORAL
Qty: 21 | Refills: 0 | Status: DISCONTINUED | COMMUNITY
Start: 2022-10-11 | End: 2022-10-19

## 2022-10-19 NOTE — HISTORY OF PRESENT ILLNESS
[FreeTextEntry1] : Patient is following up on Pain Management [de-identified] : Patient is stage 4 carcinoid tumor. Patient has pain in tailbone due to mets. Patient reports he is due to start radiation therapy. He and wife are under immense stress as their 19 yo son is currently hospitalized in and pending transfer to inpatient psych.

## 2022-10-19 NOTE — PHYSICAL EXAM
[Normal Outer Ear/Nose] : the outer ears and nose were normal in appearance [Supple] : supple [Normal] : no joint swelling and grossly normal strength and tone [Coordination Grossly Intact] : coordination grossly intact [No Focal Deficits] : no focal deficits [Normal Gait] : normal gait [Normal Affect] : the affect was normal [Normal Insight/Judgement] : insight and judgment were intact [de-identified] : RUE with midline in place [de-identified] : colostomy bag, with liquid brown contents

## 2022-11-22 ENCOUNTER — APPOINTMENT (OUTPATIENT)
Dept: FAMILY MEDICINE | Facility: CLINIC | Age: 47
End: 2022-11-22

## 2022-11-22 VITALS
DIASTOLIC BLOOD PRESSURE: 70 MMHG | WEIGHT: 190 LBS | HEIGHT: 74 IN | OXYGEN SATURATION: 96 % | BODY MASS INDEX: 24.38 KG/M2 | TEMPERATURE: 97.5 F | HEART RATE: 98 BPM | SYSTOLIC BLOOD PRESSURE: 156 MMHG

## 2022-11-22 DIAGNOSIS — Z99.2 DEPENDENCE ON RENAL DIALYSIS: ICD-10-CM

## 2022-11-22 DIAGNOSIS — K63.2 FISTULA OF INTESTINE: ICD-10-CM

## 2022-11-22 PROCEDURE — 99495 TRANSJ CARE MGMT MOD F2F 14D: CPT

## 2022-11-28 PROBLEM — Z99.2 HEMODIALYSIS PATIENT: Status: ACTIVE | Noted: 2022-11-28

## 2022-11-28 PROBLEM — K63.2 ENTEROCUTANEOUS FISTULA: Status: ACTIVE | Noted: 2022-06-23

## 2022-11-28 NOTE — PHYSICAL EXAM
[Normal Outer Ear/Nose] : the outer ears and nose were normal in appearance [Supple] : supple [Normal] : no joint swelling and grossly normal strength and tone [Coordination Grossly Intact] : coordination grossly intact [No Focal Deficits] : no focal deficits [Normal Gait] : normal gait [Normal Affect] : the affect was normal [Normal Insight/Judgement] : insight and judgment were intact [73519 - Moderate Complexity requires multiple possible diagnoses and/or the management options, moderate complexity of the medical data (tests, etc.) to be reviewed, and moderate risk of significant complications, morbidity, and/or mortality as well as co] : Moderate Complexity [de-identified] : L chest HD port [de-identified] : LUE with PICC in place [de-identified] : colostomy bag, with liquid brown contents

## 2022-11-28 NOTE — HISTORY OF PRESENT ILLNESS
[Post-hospitalization from ___ Hospital] : Post-hospitalization from [unfilled] Hospital [Admitted on: ___] : The patient was admitted on [unfilled] [Discharged on ___] : discharged on [unfilled] [Discharge Summary] : discharge summary [Pertinent Labs] : pertinent labs [Radiology Findings] : radiology findings [Discharge Med List] : discharge medication list [Med Reconciliation] : medication reconciliation has been completed [FreeTextEntry2] : Mr. 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 Patient was admitted with sepsis 2/2 to pyelonephritis and picc line infection and is now on HD for acute renal failure. Patient also completed course of antibiotics through [facility].\par \par Patient states he is doing OK, except for chronic back pain. Labs from Nephrologist reviewed.\par Patient is requesting PICC line in left arm be removed, however, patient needs to follow up with GI to determine if needed TPN.\par

## 2022-11-28 NOTE — PLAN
[FreeTextEntry1] : Follow up with Nephrology, Gastroenterology and Palliative care.\par Follow up for routine medical care in 3-4 months or sooner PRN. Will need Lipid panel, A1c and Thyroid panels at next follow up.\par Take stool softener to prevent constipation 2/2 chronic opioid pain med.\par \par

## 2022-12-07 ENCOUNTER — APPOINTMENT (OUTPATIENT)
Dept: GERIATRICS | Facility: CLINIC | Age: 47
End: 2022-12-07

## 2022-12-22 ENCOUNTER — APPOINTMENT (OUTPATIENT)
Dept: FAMILY MEDICINE | Facility: CLINIC | Age: 47
End: 2022-12-22

## 2022-12-23 ENCOUNTER — APPOINTMENT (OUTPATIENT)
Dept: FAMILY MEDICINE | Facility: CLINIC | Age: 47
End: 2022-12-23

## 2022-12-23 VITALS
TEMPERATURE: 97.6 F | SYSTOLIC BLOOD PRESSURE: 104 MMHG | RESPIRATION RATE: 14 BRPM | OXYGEN SATURATION: 98 % | HEART RATE: 79 BPM | DIASTOLIC BLOOD PRESSURE: 70 MMHG

## 2022-12-23 DIAGNOSIS — N17.9 ACUTE KIDNEY FAILURE, UNSPECIFIED: ICD-10-CM

## 2022-12-23 DIAGNOSIS — Z99.2 ACUTE KIDNEY FAILURE, UNSPECIFIED: ICD-10-CM

## 2022-12-23 DIAGNOSIS — K59.00 CONSTIPATION, UNSPECIFIED: ICD-10-CM

## 2022-12-23 PROCEDURE — 99214 OFFICE O/P EST MOD 30 MIN: CPT

## 2022-12-23 RX ORDER — ALLOPURINOL 100 MG/1
100 TABLET ORAL
Qty: 30 | Refills: 0 | Status: DISCONTINUED | COMMUNITY
Start: 2022-11-10 | End: 2022-12-23

## 2022-12-23 NOTE — HISTORY OF PRESENT ILLNESS
[FreeTextEntry1] : Pt is here for medication renewal. 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 Patient was hospitalized from 10/29/22 to 11/10/22 for sepsis 2/2 to pyelonephritis and picc line infection with acute renal failure. Since his hospitalization he has been getting dialysis. He has been off of the TPN at this time for the past 2 months and is doing well.\par \par For his cancer pain, he is currently on oxycodone 15mg three times a day. States that the current dose of his medication is working well to control his pain. He is in need of a refill at this time. He is taking dulcolax otc to prevent opioid related constipation

## 2022-12-23 NOTE — ASSESSMENT
Patient signed waiver   [FreeTextEntry1] : Cancer associated pain\par istop id: [id number]\par patient with controlled pain on current regimen\par continue oxycodone 15mg every 8 hours , refill sent\par continue dulcolax otc to prevent opioid induced constipation\par \par stage IV GI carcinoid cancer\par continue to follow with oncology for treatment plan\par \par Acute renal failure requiring hemodialysis\par patient to follow up with nephrology, will obtain records\par

## 2023-01-19 ENCOUNTER — APPOINTMENT (OUTPATIENT)
Dept: FAMILY MEDICINE | Facility: CLINIC | Age: 48
End: 2023-01-19
Payer: MEDICAID

## 2023-01-19 VITALS
RESPIRATION RATE: 14 BRPM | HEART RATE: 60 BPM | OXYGEN SATURATION: 98 % | SYSTOLIC BLOOD PRESSURE: 110 MMHG | DIASTOLIC BLOOD PRESSURE: 70 MMHG | TEMPERATURE: 97.6 F

## 2023-01-19 PROCEDURE — 99214 OFFICE O/P EST MOD 30 MIN: CPT

## 2023-01-19 RX ORDER — NALOXONE HYDROCHLORIDE 4 MG/.1ML
4 SPRAY NASAL ONCE
Qty: 2 | Refills: 1 | Status: ACTIVE | COMMUNITY
Start: 2023-01-19 | End: 1900-01-01

## 2023-01-19 NOTE — ASSESSMENT
[FreeTextEntry1] : Cancer associated pain\par istop id: [id number]\par patient[s pain is not well controlled on current regimen\par will increase oxycodone to 20mg every 8 hours , refill sent\par will prescribe nasal narcan to have in case of respiratory depression as patient on >50 morphine mg equivalents per day\par continue dulcolax otc to prevent opioid induced constipation\par \par stage IV GI carcinoid cancer\par continue to follow with oncology for treatment plan

## 2023-01-19 NOTE — HISTORY OF PRESENT ILLNESS
[FreeTextEntry1] : Pt is here for medication renewal. 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 Patient was hospitalized from 10/29/22 to 11/10/22 for sepsis 2/2 to pyelonephritis and picc line infection with acute renal failure.He was undergoing dialysis, but he is off of it at this time. He has been off of the TPN at this time for the past 3 months and is doing well.\par \par For his cancer pain, he is currently on oxycodone 15mg three times a day. States that he no longer feels that the current dose of his medication is working for him.  States that 3 hours after taking the pill he begins to have severe pain.  His pain is mostly in his abdomen and tailbone.

## 2023-02-17 ENCOUNTER — APPOINTMENT (OUTPATIENT)
Dept: FAMILY MEDICINE | Facility: CLINIC | Age: 48
End: 2023-02-17
Payer: MEDICAID

## 2023-02-17 VITALS
OXYGEN SATURATION: 99 % | RESPIRATION RATE: 14 BRPM | WEIGHT: 190 LBS | BODY MASS INDEX: 24.38 KG/M2 | HEIGHT: 74 IN | HEART RATE: 59 BPM | TEMPERATURE: 97.8 F

## 2023-02-17 VITALS — DIASTOLIC BLOOD PRESSURE: 65 MMHG | SYSTOLIC BLOOD PRESSURE: 102 MMHG

## 2023-02-17 PROCEDURE — 99214 OFFICE O/P EST MOD 30 MIN: CPT

## 2023-02-17 NOTE — ASSESSMENT
[FreeTextEntry1] : Cancer associated pain\par istop id: [id number]\par patient's pain is not well controlled on current regimen and he has difficulty with the larger pills\par will renew 15mg oxycodone tablets, can take 2 at a time for severe pain\par prescribed nasal narcan at last visit to have in case of respiratory depression\par continue dulcolax otc to prevent opioid induced constipation\par if no improvement in pain with increased dose, patient will be referred to palliative care for improved pain management\par \par stage IV GI carcinoid cancer\par continue to follow with oncology for treatment plan

## 2023-02-17 NOTE — HISTORY OF PRESENT ILLNESS
[FreeTextEntry1] : pt. 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 Patient was hospitalized from 10/29/22 to 11/10/22 for sepsis 2/2 to pyelonephritis and picc line infection with acute renal failure.He was undergoing dialysis, but he is off of it at this time. He has been off of the TPN since november 2022.\par \par For his cancer pain, he is currently on oxycodone 20mg three times a day. States that he does not believe that increasing his dose to 20 mg 3 times a day has been helpful in controlling his pain.  States he is also having difficulty swallowing the larger pills. His pain is mostly in his abdomen and tailbone where he has sacral metastasis.

## 2023-02-28 NOTE — REVIEW OF SYSTEMS
slight oozing. Dsg removed and manual pressure applied X 5 minutes. No residual bleeding. [Negative] : Heme/Lymph

## 2023-03-17 ENCOUNTER — APPOINTMENT (OUTPATIENT)
Dept: FAMILY MEDICINE | Facility: CLINIC | Age: 48
End: 2023-03-17
Payer: MEDICAID

## 2023-03-17 VITALS
HEART RATE: 47 BPM | DIASTOLIC BLOOD PRESSURE: 60 MMHG | WEIGHT: 195 LBS | SYSTOLIC BLOOD PRESSURE: 130 MMHG | HEIGHT: 73 IN | BODY MASS INDEX: 25.84 KG/M2 | OXYGEN SATURATION: 99 %

## 2023-03-17 PROCEDURE — 99213 OFFICE O/P EST LOW 20 MIN: CPT

## 2023-03-17 NOTE — HISTORY OF PRESENT ILLNESS
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 Patient was hospitalized from 10/29/22 to 11/10/22 for sepsis 2/2 to pyelonephritis and picc line infection with acute renal failure.He was undergoing dialysis, but he is off of it at this time. He has been off of the TPN since november 2022.\par \par For his cancer pain, he was recently increased to oxycodone 30mg three times a day.  His pain is mostly in his abdomen and tailbone where he has sacral metastasis. According to [facility] he recently got a prescription of 10 pills of oxycodone-acteaminophen 5-325mg tablets on 3/15/23. States that he went to the hospital due to pain and irritation around his colostomy bag and was given these medications due to the increased uncontrolled pain he was having, however he did not take them. Patient states he feels that his pain is adequately controlled at this time

## 2023-03-17 NOTE — PHYSICAL EXAM
[Coordination Grossly Intact] : coordination grossly intact [No Focal Deficits] : no focal deficits [Normal Gait] : normal gait [Normal] : affect was normal and insight and judgment were intact [de-identified] : ostomy bag intact, no rashes or irritation noted at this time

## 2023-03-17 NOTE — ASSESSMENT
[FreeTextEntry1] : Cancer associated pain\par istop id: [id number]\par patient's pain is well controlled on current regimen\par will renew 15mg oxycodone tablets to be taken two at a time up to 3 times per day\par prescribed nasal narcan at last visit to have in case of respiratory depression\par continue dulcolax otc to prevent opioid induced constipation\par discussed with patient that due to the large dose of opioids he is on for his cancer related pain, he should be following with a palliative care doctor/team in order to ensure that his regimen is appropriate and his pain is well managed\par provided patient with palliative care referral\par \par stage IV GI carcinoid cancer\par continue to follow with oncology for treatment plan

## 2023-04-20 ENCOUNTER — APPOINTMENT (OUTPATIENT)
Dept: FAMILY MEDICINE | Facility: CLINIC | Age: 48
End: 2023-04-20
Payer: MEDICAID

## 2023-04-20 VITALS
DIASTOLIC BLOOD PRESSURE: 86 MMHG | SYSTOLIC BLOOD PRESSURE: 132 MMHG | BODY MASS INDEX: 25.18 KG/M2 | WEIGHT: 190 LBS | TEMPERATURE: 97.7 F | HEART RATE: 72 BPM | OXYGEN SATURATION: 98 % | HEIGHT: 73 IN

## 2023-04-20 DIAGNOSIS — M79.641 PAIN IN RIGHT HAND: ICD-10-CM

## 2023-04-20 DIAGNOSIS — Z20.822 CONTACT WITH AND (SUSPECTED) EXPOSURE TO COVID-19: ICD-10-CM

## 2023-04-20 DIAGNOSIS — M79.643 PAIN IN UNSPECIFIED HAND: ICD-10-CM

## 2023-04-20 PROCEDURE — 99214 OFFICE O/P EST MOD 30 MIN: CPT

## 2023-04-23 NOTE — PHYSICAL EXAM
[Normal Outer Ear/Nose] : the outer ears and nose were normal in appearance [Supple] : supple [Normal] : normal rate, regular rhythm, normal S1 and S2 and no murmur heard [Pedal Pulses Present] : the pedal pulses are present [No Edema] : there was no peripheral edema [No Rash] : no rash [Coordination Grossly Intact] : coordination grossly intact [No Focal Deficits] : no focal deficits [Normal Gait] : normal gait [Normal Affect] : the affect was normal [Normal Insight/Judgement] : insight and judgment were intact [de-identified] : L chest HD port [de-identified] : tender over right dorsal hand at 3rd  and 4th phalanges  [de-identified] : colostomy bag, with liquid brown contents

## 2023-04-23 NOTE — HISTORY OF PRESENT ILLNESS
[FreeTextEntry8] : Mr. MCKENZIE WAN is a 47 year M who presents with c/o all over body pain, worse at the tail bone. He states that in the past 2 months, pain has worsened. Patient has chronic pain due to metastatic carcinoid cancer.  He  is no longer on dialysis but follows closely with nephrology. \par Patient also states he hurt his right hand showing his son MMA techniques.

## 2023-04-23 NOTE — PLAN
[FreeTextEntry1] : The risks/benefits/alternatives of treatment options were discussed and patient was educated about any medications or treatment plans. Patient was allowed to ask questions. All questions were answered and patient expressed understanding. Patient was advised to call with any concerns.\par \par Time spent on day of appointment included time for chart review, discussion of pertinent laboratory and imaging results, documentation of encounter, medication management as a appropriate, and coordination of care.\par \par

## 2023-05-01 ENCOUNTER — APPOINTMENT (OUTPATIENT)
Dept: FAMILY MEDICINE | Facility: CLINIC | Age: 48
End: 2023-05-01

## 2023-05-17 ENCOUNTER — EMERGENCY (EMERGENCY)
Facility: HOSPITAL | Age: 48
LOS: 1 days | Discharge: HOME | End: 2023-05-17
Attending: EMERGENCY MEDICINE
Payer: MEDICAID

## 2023-05-17 VITALS
RESPIRATION RATE: 16 BRPM | TEMPERATURE: 98 F | SYSTOLIC BLOOD PRESSURE: 118 MMHG | HEART RATE: 56 BPM | OXYGEN SATURATION: 99 % | DIASTOLIC BLOOD PRESSURE: 69 MMHG

## 2023-05-17 VITALS
RESPIRATION RATE: 18 BRPM | DIASTOLIC BLOOD PRESSURE: 68 MMHG | TEMPERATURE: 98 F | SYSTOLIC BLOOD PRESSURE: 112 MMHG | HEART RATE: 43 BPM | OXYGEN SATURATION: 100 %

## 2023-05-17 DIAGNOSIS — Z98.890 OTHER SPECIFIED POSTPROCEDURAL STATES: Chronic | ICD-10-CM

## 2023-05-17 LAB
ALBUMIN SERPL ELPH-MCNC: 4.1 G/DL — SIGNIFICANT CHANGE UP (ref 3.3–5.2)
ALP SERPL-CCNC: 132 U/L — HIGH (ref 40–120)
ALT FLD-CCNC: 29 U/L — SIGNIFICANT CHANGE UP
ANION GAP SERPL CALC-SCNC: 16 MMOL/L — SIGNIFICANT CHANGE UP (ref 5–17)
AST SERPL-CCNC: 23 U/L — SIGNIFICANT CHANGE UP
BASOPHILS # BLD AUTO: 0.01 K/UL — SIGNIFICANT CHANGE UP (ref 0–0.2)
BASOPHILS NFR BLD AUTO: 0.2 % — SIGNIFICANT CHANGE UP (ref 0–2)
BILIRUB SERPL-MCNC: 0.4 MG/DL — SIGNIFICANT CHANGE UP (ref 0.4–2)
BUN SERPL-MCNC: 11.7 MG/DL — SIGNIFICANT CHANGE UP (ref 8–20)
CALCIUM SERPL-MCNC: 8.8 MG/DL — SIGNIFICANT CHANGE UP (ref 8.4–10.5)
CHLORIDE SERPL-SCNC: 107 MMOL/L — SIGNIFICANT CHANGE UP (ref 96–108)
CO2 SERPL-SCNC: 21 MMOL/L — LOW (ref 22–29)
CREAT SERPL-MCNC: 1.35 MG/DL — HIGH (ref 0.5–1.3)
EGFR: 65 ML/MIN/1.73M2 — SIGNIFICANT CHANGE UP
EOSINOPHIL # BLD AUTO: 0.12 K/UL — SIGNIFICANT CHANGE UP (ref 0–0.5)
EOSINOPHIL NFR BLD AUTO: 2 % — SIGNIFICANT CHANGE UP (ref 0–6)
GLUCOSE SERPL-MCNC: 80 MG/DL — SIGNIFICANT CHANGE UP (ref 70–99)
HCT VFR BLD CALC: 34.5 % — LOW (ref 39–50)
HGB BLD-MCNC: 11.3 G/DL — LOW (ref 13–17)
IMM GRANULOCYTES NFR BLD AUTO: 0.5 % — SIGNIFICANT CHANGE UP (ref 0–0.9)
LIDOCAIN IGE QN: 35 U/L — SIGNIFICANT CHANGE UP (ref 22–51)
LYMPHOCYTES # BLD AUTO: 1.19 K/UL — SIGNIFICANT CHANGE UP (ref 1–3.3)
LYMPHOCYTES # BLD AUTO: 19.5 % — SIGNIFICANT CHANGE UP (ref 13–44)
MCHC RBC-ENTMCNC: 27.6 PG — SIGNIFICANT CHANGE UP (ref 27–34)
MCHC RBC-ENTMCNC: 32.8 GM/DL — SIGNIFICANT CHANGE UP (ref 32–36)
MCV RBC AUTO: 84.1 FL — SIGNIFICANT CHANGE UP (ref 80–100)
MONOCYTES # BLD AUTO: 0.48 K/UL — SIGNIFICANT CHANGE UP (ref 0–0.9)
MONOCYTES NFR BLD AUTO: 7.9 % — SIGNIFICANT CHANGE UP (ref 2–14)
NEUTROPHILS # BLD AUTO: 4.27 K/UL — SIGNIFICANT CHANGE UP (ref 1.8–7.4)
NEUTROPHILS NFR BLD AUTO: 69.9 % — SIGNIFICANT CHANGE UP (ref 43–77)
PLATELET # BLD AUTO: 201 K/UL — SIGNIFICANT CHANGE UP (ref 150–400)
POTASSIUM SERPL-MCNC: 4.5 MMOL/L — SIGNIFICANT CHANGE UP (ref 3.5–5.3)
POTASSIUM SERPL-SCNC: 4.5 MMOL/L — SIGNIFICANT CHANGE UP (ref 3.5–5.3)
PROT SERPL-MCNC: 7.1 G/DL — SIGNIFICANT CHANGE UP (ref 6.6–8.7)
RBC # BLD: 4.1 M/UL — LOW (ref 4.2–5.8)
RBC # FLD: 13.3 % — SIGNIFICANT CHANGE UP (ref 10.3–14.5)
SODIUM SERPL-SCNC: 144 MMOL/L — SIGNIFICANT CHANGE UP (ref 135–145)
WBC # BLD: 6.1 K/UL — SIGNIFICANT CHANGE UP (ref 3.8–10.5)
WBC # FLD AUTO: 6.1 K/UL — SIGNIFICANT CHANGE UP (ref 3.8–10.5)

## 2023-05-17 PROCEDURE — 36415 COLL VENOUS BLD VENIPUNCTURE: CPT

## 2023-05-17 PROCEDURE — G1004: CPT

## 2023-05-17 PROCEDURE — 96375 TX/PRO/DX INJ NEW DRUG ADDON: CPT

## 2023-05-17 PROCEDURE — 96376 TX/PRO/DX INJ SAME DRUG ADON: CPT

## 2023-05-17 PROCEDURE — 74177 CT ABD & PELVIS W/CONTRAST: CPT | Mod: 26,MA

## 2023-05-17 PROCEDURE — 71045 X-RAY EXAM CHEST 1 VIEW: CPT | Mod: 26

## 2023-05-17 PROCEDURE — 99285 EMERGENCY DEPT VISIT HI MDM: CPT

## 2023-05-17 PROCEDURE — 71275 CT ANGIOGRAPHY CHEST: CPT | Mod: 26,ME

## 2023-05-17 PROCEDURE — 74177 CT ABD & PELVIS W/CONTRAST: CPT | Mod: MA

## 2023-05-17 PROCEDURE — 72128 CT CHEST SPINE W/O DYE: CPT | Mod: 26,MG

## 2023-05-17 PROCEDURE — 93010 ELECTROCARDIOGRAM REPORT: CPT | Mod: 77

## 2023-05-17 PROCEDURE — 96374 THER/PROPH/DIAG INJ IV PUSH: CPT

## 2023-05-17 PROCEDURE — 99285 EMERGENCY DEPT VISIT HI MDM: CPT | Mod: 25

## 2023-05-17 PROCEDURE — 85025 COMPLETE CBC W/AUTO DIFF WBC: CPT

## 2023-05-17 PROCEDURE — 80053 COMPREHEN METABOLIC PANEL: CPT

## 2023-05-17 PROCEDURE — 84484 ASSAY OF TROPONIN QUANT: CPT

## 2023-05-17 PROCEDURE — 71045 X-RAY EXAM CHEST 1 VIEW: CPT

## 2023-05-17 PROCEDURE — 93005 ELECTROCARDIOGRAM TRACING: CPT

## 2023-05-17 PROCEDURE — 71275 CT ANGIOGRAPHY CHEST: CPT | Mod: ME

## 2023-05-17 PROCEDURE — 93010 ELECTROCARDIOGRAM REPORT: CPT

## 2023-05-17 PROCEDURE — 83690 ASSAY OF LIPASE: CPT

## 2023-05-17 PROCEDURE — 72131 CT LUMBAR SPINE W/O DYE: CPT | Mod: 26,ME

## 2023-05-17 RX ORDER — OXYCODONE HYDROCHLORIDE 5 MG/1
1 TABLET ORAL
Qty: 6 | Refills: 0
Start: 2023-05-17

## 2023-05-17 RX ORDER — SODIUM CHLORIDE 9 MG/ML
1000 INJECTION INTRAMUSCULAR; INTRAVENOUS; SUBCUTANEOUS ONCE
Refills: 0 | Status: COMPLETED | OUTPATIENT
Start: 2023-05-17 | End: 2023-05-17

## 2023-05-17 RX ORDER — ONDANSETRON 8 MG/1
4 TABLET, FILM COATED ORAL ONCE
Refills: 0 | Status: COMPLETED | OUTPATIENT
Start: 2023-05-17 | End: 2023-05-17

## 2023-05-17 RX ORDER — HYDROMORPHONE HYDROCHLORIDE 2 MG/ML
0.5 INJECTION INTRAMUSCULAR; INTRAVENOUS; SUBCUTANEOUS ONCE
Refills: 0 | Status: DISCONTINUED | OUTPATIENT
Start: 2023-05-17 | End: 2023-05-17

## 2023-05-17 RX ORDER — ACETAMINOPHEN 500 MG
1000 TABLET ORAL ONCE
Refills: 0 | Status: COMPLETED | OUTPATIENT
Start: 2023-05-17 | End: 2023-05-17

## 2023-05-17 RX ADMIN — ONDANSETRON 4 MILLIGRAM(S): 8 TABLET, FILM COATED ORAL at 05:23

## 2023-05-17 RX ADMIN — Medication 400 MILLIGRAM(S): at 08:32

## 2023-05-17 RX ADMIN — HYDROMORPHONE HYDROCHLORIDE 0.5 MILLIGRAM(S): 2 INJECTION INTRAMUSCULAR; INTRAVENOUS; SUBCUTANEOUS at 05:23

## 2023-05-17 RX ADMIN — SODIUM CHLORIDE 1000 MILLILITER(S): 9 INJECTION INTRAMUSCULAR; INTRAVENOUS; SUBCUTANEOUS at 06:41

## 2023-05-17 RX ADMIN — HYDROMORPHONE HYDROCHLORIDE 0.5 MILLIGRAM(S): 2 INJECTION INTRAMUSCULAR; INTRAVENOUS; SUBCUTANEOUS at 06:22

## 2023-05-17 NOTE — ED ADULT NURSE REASSESSMENT NOTE - NS ED NURSE REASSESS COMMENT FT1
assumed care of pt from night RN; per RN report pt pending CT and results r/o SBO. pt a&ox3 and noted to be bradycardic on shift report HR 40, asymptomatic.  pt states "this is normal for me". informed MD of pt HR. pending results. pt also has colostomy pouch and able to do self-care in ED. will continue to monitor

## 2023-05-17 NOTE — ED PROVIDER NOTE - CLINICAL SUMMARY MEDICAL DECISION MAKING FREE TEXT BOX
47y Male with abdominal pain and nausea in the setting of carcinoid tumor s/p colectomy and recent partial bowel obstruction at [facility]. Hemodynamically stable, abdomen soft and nontender. Differential diagnosis includes but not limited to SBO, electrolyte derangement, pancreatitis, medication side effect. 47y Male with abdominal pain and nausea in the setting of carcinoid tumor s/p colectomy and recent partial bowel obstruction at [facility]. Hemodynamically stable, abdomen soft and nontender. Differential diagnosis includes but not limited to SBO, electrolyte derangement, pancreatitis, medication side effect.    Clinical Course in ED:  patient received on signout from Dr. Chapman pending CT. labs normal.   updated patient on results of CT and findings of metastatic lesions in liver, mesentary, and paravertebral space. patient already knows about these findings and will f/up with onocologist.  patient reports pain controlled but requesting 1 day of medication until he is able to see his doctor tomorrow.  agrees to follow up with oncologist regarding CT findings.   Discussed results and outcome of testing with the patient, given copy as well. The patient was provided an opportunity to ask questions and voice their concerns, all of which were addressed at length. Strict return precautions discussed with the patient. The patient verbalized understanding of the plan and agrees to follow through with it. The patient is safe for discharge at this time with close outpatient follow up.   Patient advised to please follow up with their primary care doctor within the next 24 hours and return to the Emergency Department for worsening symptoms or any other concerns.  Patient advised that their doctor may call [phone] to follow up on the specific results of the tests performed today in the emergency department.

## 2023-05-17 NOTE — ED PROVIDER NOTE - NSFOLLOWUPINSTRUCTIONS_ED_ALL_ED_FT
You are advised to please follow up with your primary care doctor within the next 24 hours and return to the Emergency Department for worsening symptoms or any other concerns.  Your doctor may call [phone] to follow up on the specific results of the tests performed today in the emergency department.

## 2023-05-17 NOTE — ED ADULT NURSE REASSESSMENT NOTE - NSFALLUNIVINTERV_ED_ALL_ED
Bed/Stretcher in lowest position, wheels locked, appropriate side rails in place/Call bell, personal items and telephone in reach/Instruct patient to call for assistance before getting out of bed/chair/stretcher/Non-slip footwear applied when patient is off stretcher/[place] to call system/Physically safe environment - no spills, clutter or unnecessary equipment/Purposeful proactive rounding/Room/bathroom lighting operational, light cord in reach

## 2023-05-17 NOTE — ED ADULT NURSE NOTE - CHIEF COMPLAINT QUOTE
Ambulatory reporting mid abdominal, R sided rib and back pain just seen @[facility] for same and was told her has a partial bowel obstruction. 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 30mg oxycodone las taken yesterday and was given Dilaudid at the hospital with relief. Denies N/V, sick contacts.

## 2023-05-17 NOTE — ED PROVIDER NOTE - ATTENDING CONTRIBUTION TO CARE
47y M w/ hx carcinoid tumor s/p colectomy with colostomy; presents for chest/abdominal/back pain. Says he was discharged from [facility] few days ago after having CT done that showed partial bowel obstruction. Says he initially felt better but now has increased abdominal pain. Also complaining of right lower "rib" pain worse with deep breath, as well as left mid back pain. No fever or vomiting. Has been having normal output from colostomy. Pt follows with Dr. Sam of [facility] and says he is supposed to begin radiation therapy. On exam, pt well appearing and in no distress. Heart RRR, lungs CTAB. Abdomen soft with mild periumbilical tenderness. No reproducible chest wall tenderness. No focal neuro deficits. Will treat for pain, check labs, CTs, EKG. 47y M w/ hx carcinoid tumor s/p colectomy with colostomy; presents for chest/abdominal/back pain. Says he was discharged from [facility] few days ago after having CT done that showed partial bowel obstruction. Says he initially felt better but now has increased abdominal pain. Also complaining of right lower "rib" pain worse with deep breath, as well as left mid back pain. No fever or vomiting. Has been having normal output from colostomy. Pt follows with Dr. Sam of [facility] and says he is supposed to begin radiation therapy. Says he has been taking oxycodone 30 mg without relief. On exam, pt well appearing and in no distress. Heart RRR, lungs CTAB. Abdomen soft with mild periumbilical tenderness. No reproducible chest wall tenderness. No focal neuro deficits. Will treat for pain, check labs, CTs, EKG.

## 2023-05-17 NOTE — ED ADULT NURSE NOTE - EXTENSIONS OF SELF_ADULT
None Complex Repair And Dorsal Nasal Flap Text: The defect edges were debeveled with a #15 scalpel blade.  The primary defect was closed partially with a complex linear closure.  Given the location of the remaining defect, shape of the defect and the proximity to free margins a dorsal nasal flap was deemed most appropriate for complete closure of the defect.  Using a sterile surgical marker, an appropriate flap was drawn incorporating the defect and placing the expected incisions within the relaxed skin tension lines where possible.    The area thus outlined was incised deep to adipose tissue with a #15 scalpel blade.  The skin margins were undermined to an appropriate distance in all directions utilizing iris scissors.

## 2023-05-17 NOTE — ED ADULT NURSE REASSESSMENT NOTE - NSFALLRISK_ED_ALL_ED
This pt says she is returning a call to PCP's M.A.  M.A.not available.  Please call her at the number above.  No message was found.   No

## 2023-05-17 NOTE — ED ADULT TRIAGE NOTE - CHIEF COMPLAINT QUOTE
Ambulatory reporting mid abdominal, R sided rib and back pain just seen @[facility] for same and was told her has a partial bowel obstruction. 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 30mg oxycodone las taken yesterday and was given Dilaudid at the hospital with relief. Denies N/V, sick contacts.

## 2023-05-17 NOTE — ED PEDIATRIC NURSE NOTE - OBJECTIVE STATEMENT
pt came in complaining of abdominal pain n/v pt states he has stage 4 cancer that causes pain that is unrelieved by current pain modalities. pt states he was recently treated at [facility]. and received minimal relief. pt is has a colostomy bag and reports generalized pain and not to a specific area

## 2023-05-17 NOTE — ED PROVIDER NOTE - OBJECTIVE STATEMENT
47y Male with history of carcinoid tumor s/p colectomy with colostomy presenting with abdominal pain and nausea with no fever, chest pain, shortness of breath. Patient states his abdominal feels distended and reports that he was recently evaluated at [facility] ED diagnosed with partial bowel obstruction. Reports output with colostomy. On 30mg of oxycodone daily. Patient requesting Dilaudid. Denies fevers, chills, headache, palpitations, diarrhea, hematuria, dysuria, dark stools, focal neurologic symptoms.

## 2023-05-17 NOTE — ED PROVIDER NOTE - PATIENT PORTAL LINK FT
You can access the FollowMyHealth Patient Portal offered by [facility] by registering at the following website: [email or web address]. By joining [facility]’s FollowMyHealth portal, you will also be able to view your health information using other applications (apps) compatible with our system.

## 2023-05-17 NOTE — ED PROVIDER NOTE - PROGRESS NOTE DETAILS
MD Tha: received signout on patient from Dr. Chapman.  updated patient on results of CT and findings of metastatic lesions in liver, mesentary, and paravertebral space. patient already knows about these findings and will f/up with onocologist.  patient reports pain controlled but requesting 1 day of medication until he is able to see his doctor tomorrow.  agrees to follow up with oncologist regarding CT findings.

## 2023-05-17 NOTE — ED PROVIDER NOTE - PHYSICAL EXAMINATION
General: Well appearing in no acute distress, alert and cooperative  Head: Normocephalic, atraumatic  Eyes: PERRLA, no conjunctival injection, no scleral icterus, EOMI  ENMT: Atraumatic external nose and ears  Neck: Soft and supple, full ROM without pain  Cardiac: Regular rate and regular rhythm, no murmurs  Resp: Unlabored respiratory effort, lungs CTAB  Abd: Soft, non-tender, non-distended, colostomy in place with output  MSK: Spine midline and non-tender   Skin: Warm and dry  Neuro: AO x 3, moves all extremities symmetrically, Motor strength and sensation grossly intact

## 2023-05-18 ENCOUNTER — APPOINTMENT (OUTPATIENT)
Dept: FAMILY MEDICINE | Facility: CLINIC | Age: 48
End: 2023-05-18
Payer: MEDICAID

## 2023-05-18 VITALS
SYSTOLIC BLOOD PRESSURE: 118 MMHG | HEART RATE: 69 BPM | BODY MASS INDEX: 24.93 KG/M2 | DIASTOLIC BLOOD PRESSURE: 74 MMHG | WEIGHT: 188.13 LBS | TEMPERATURE: 97.8 F | OXYGEN SATURATION: 100 % | HEIGHT: 73 IN

## 2023-05-18 DIAGNOSIS — C7B.00 SECONDARY CARCINOID TUMORS, UNSPECIFIED SITE: ICD-10-CM

## 2023-05-18 PROCEDURE — 99214 OFFICE O/P EST MOD 30 MIN: CPT

## 2023-05-22 PROBLEM — C7B.00 METASTATIC CARCINOID TUMOR: Status: ACTIVE | Noted: 2023-04-02

## 2023-05-22 NOTE — HISTORY OF PRESENT ILLNESS
[FreeTextEntry1] : Patient c/o right sided rib pain x 2 days.\par Patient rates the pain as a 10/10, and constant.\par He presented to [facility] ER yesterday. [de-identified] : Patient is following up for pain management for metastatic cancer. Patient has run out of previous script, stating his wife gives him the script and believes he takes it as prescribed but he run out of his script sooner the designated days. Patient reports he was in severe pain so presented to the [facility] ED. He had imaging that is consistent with metastatic disease.

## 2023-05-22 NOTE — ASSESSMENT
[FreeTextEntry1] : This is a 48 yo male presenting to follow up for pain management associated with metastatic carcinoid. Patient had had increasing need for pain medication. Patient had been referred to palliative medicine in the past and did not follow up. I have explained to patient that high amounts of opioid prescriptions may be needed as disease progresses and it is important to follow up with palliative medicine to receive the best and most comprehensive management of his pain.

## 2023-05-22 NOTE — PHYSICAL EXAM
[Normal Outer Ear/Nose] : the outer ears and nose were normal in appearance [Supple] : supple [Normal] : normal rate, regular rhythm, normal S1 and S2 and no murmur heard [Pedal Pulses Present] : the pedal pulses are present [No Edema] : there was no peripheral edema [No Rash] : no rash [Coordination Grossly Intact] : coordination grossly intact [No Focal Deficits] : no focal deficits [Normal Gait] : normal gait [Normal Affect] : the affect was normal [Normal Insight/Judgement] : insight and judgment were intact [de-identified] : L chest HD port [de-identified] : colostomy bag, with liquid brown contents [de-identified] : tender over right dorsal hand at 3rd  and 4th phalanges

## 2023-06-07 ENCOUNTER — APPOINTMENT (OUTPATIENT)
Dept: FAMILY MEDICINE | Facility: CLINIC | Age: 48
End: 2023-06-07
Payer: MEDICAID

## 2023-06-07 DIAGNOSIS — M53.3 SACROCOCCYGEAL DISORDERS, NOT ELSEWHERE CLASSIFIED: ICD-10-CM

## 2023-06-07 PROCEDURE — 99214 OFFICE O/P EST MOD 30 MIN: CPT | Mod: 95

## 2023-06-12 PROBLEM — M53.3 COCCYDYNIA: Status: ACTIVE | Noted: 2021-11-23
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
## 2023-06-12 NOTE — PHYSICAL EXAM
[de-identified] : Telehealth precludes traditional, comprehensive physical exam. Patient appeared stable and alert.

## 2023-06-12 NOTE — PLAN
[FreeTextEntry1] : Patient was informed of rules surrounding prescription of opioid pain medication re: ORION AREN.\par Patient would have to call insurance company to explain what happened and see if they are willing to cover medications or if he needs to pay out of pocket.

## 2023-06-21 ENCOUNTER — APPOINTMENT (OUTPATIENT)
Dept: FAMILY MEDICINE | Facility: CLINIC | Age: 48
End: 2023-06-21
Payer: MEDICAID

## 2023-06-21 VITALS
SYSTOLIC BLOOD PRESSURE: 105 MMHG | BODY MASS INDEX: 25.05 KG/M2 | DIASTOLIC BLOOD PRESSURE: 62 MMHG | OXYGEN SATURATION: 98 % | HEART RATE: 87 BPM | TEMPERATURE: 98 F | RESPIRATION RATE: 14 BRPM | HEIGHT: 73 IN | WEIGHT: 189 LBS

## 2023-06-21 DIAGNOSIS — C7A.029: ICD-10-CM

## 2023-06-21 PROCEDURE — 99214 OFFICE O/P EST MOD 30 MIN: CPT

## 2023-06-26 PROBLEM — C7A.029: Status: ACTIVE | Noted: 2020-10-14

## 2023-06-26 RX ORDER — ONDANSETRON 8 MG/1
8 TABLET ORAL TWICE DAILY
Qty: 60 | Refills: 0 | Status: DISCONTINUED | COMMUNITY
Start: 2022-05-03 | End: 2023-06-26

## 2023-06-26 RX ORDER — SUCRALFATE 1 G/1
1 TABLET ORAL
Qty: 120 | Refills: 0 | Status: DISCONTINUED | COMMUNITY
Start: 2022-05-03 | End: 2023-06-26

## 2023-06-26 NOTE — PHYSICAL EXAM
[Normal Sclera/Conjunctiva] : normal sclera/conjunctiva [Supple] : supple [Normal] : normal rate, regular rhythm, normal S1 and S2 and no murmur heard [No Joint Swelling] : no joint swelling [Grossly Normal Strength/Tone] : grossly normal strength/tone [Coordination Grossly Intact] : coordination grossly intact [No Focal Deficits] : no focal deficits [Normal Gait] : normal gait [Normal Affect] : the affect was normal [Normal Insight/Judgement] : insight and judgment were intact [de-identified] : colostomy bag intact, with liquid stool

## 2023-06-26 NOTE — HISTORY OF PRESENT ILLNESS
[Other: _____] : [unfilled] [FreeTextEntry1] : Patient is here for medications renewal. [de-identified] : Mr. MCKENZIE WAN is a 47 year male who presents in office to follow up on pain management. Patient assures me he has a safe to keep medications and  there will be no theft issues with with opioid medications.

## 2023-07-06 ENCOUNTER — APPOINTMENT (OUTPATIENT)
Dept: FAMILY MEDICINE | Facility: CLINIC | Age: 48
End: 2023-07-06

## 2023-07-18 ENCOUNTER — APPOINTMENT (OUTPATIENT)
Dept: FAMILY MEDICINE | Facility: CLINIC | Age: 48
End: 2023-07-18
Payer: MEDICAID

## 2023-07-18 VITALS
BODY MASS INDEX: 23.86 KG/M2 | SYSTOLIC BLOOD PRESSURE: 124 MMHG | HEART RATE: 79 BPM | HEIGHT: 73 IN | DIASTOLIC BLOOD PRESSURE: 78 MMHG | OXYGEN SATURATION: 98 % | WEIGHT: 180 LBS

## 2023-07-18 DIAGNOSIS — G89.3 NEOPLASM RELATED PAIN (ACUTE) (CHRONIC): ICD-10-CM

## 2023-07-18 DIAGNOSIS — K63.2 FISTULA OF INTESTINE: ICD-10-CM

## 2023-07-18 DIAGNOSIS — R23.8 OTHER SKIN CHANGES: ICD-10-CM

## 2023-07-18 DIAGNOSIS — K94.19 OTHER COMPLICATIONS OF ENTEROSTOMY: ICD-10-CM

## 2023-07-18 PROCEDURE — 99214 OFFICE O/P EST MOD 30 MIN: CPT

## 2023-07-19 RX ORDER — LIDOCAINE 4% 4 G/100G
4 GEL TOPICAL
Qty: 1 | Refills: 0 | Status: ACTIVE | COMMUNITY
Start: 2023-07-18 | End: 1900-01-01

## 2023-07-21 ENCOUNTER — APPOINTMENT (OUTPATIENT)
Dept: FAMILY MEDICINE | Facility: CLINIC | Age: 48
End: 2023-07-21

## 2023-07-21 PROBLEM — R23.8 SKIN IRRITATION: Status: ACTIVE | Noted: 2023-07-19

## 2023-07-21 PROBLEM — K63.2 ENTEROCUTANEOUS FISTULA: Status: ACTIVE | Noted: 2022-06-23

## 2023-07-21 PROBLEM — K94.19 ALTERED BOWEL ELIMINATION DUE TO INTESTINAL OSTOMY: Status: ACTIVE | Noted: 2023-07-21

## 2023-07-21 PROBLEM — G89.3 CANCER ASSOCIATED PAIN: Status: ACTIVE | Noted: 2020-10-14

## 2023-07-21 RX ORDER — AMOXICILLIN AND CLAVULANATE POTASSIUM 875; 125 MG/1; MG/1
875-125 TABLET, COATED ORAL
Qty: 20 | Refills: 0 | Status: COMPLETED | COMMUNITY
Start: 2023-07-04

## 2023-07-21 NOTE — HISTORY OF PRESENT ILLNESS
[FreeTextEntry1] : Patient presents in office today to request pills for break through pain.\par Pt. states about 2 weeks ago he ran out of colostomy bags because he could not afford more and he taped a garbage bag to his ostomy and fell asleep and woke up in a lot of pain. \par Pt. states he developed cellulitis because of this and regular pain medication did not help so he took an extra oxycodone and had relief. \par Patient c/o pain at ostomy site every time he needs to change colostomy bag.\par Pt. states he is not out of pills but wants to discuss getting a medication for break through pain.\par Pt. states that the 1 or 2 days he is not in pain he feels a dependency to still take the oxycodone almost like an addiction. [de-identified] : Patient presents for management of chronic opioid pain medication. He was recently treated in the ED for cellulitis due to use of improvised colostomy bag which led to irritation and infection the skin around the site.\par Patient was asking for additional medication for breakthrough pain.  He states he used one extra medication due to the infection.

## 2023-07-21 NOTE — PHYSICAL EXAM
[Normal Sclera/Conjunctiva] : normal sclera/conjunctiva [Supple] : supple [Normal] : normal rate, regular rhythm, normal S1 and S2 and no murmur heard [No Joint Swelling] : no joint swelling [Grossly Normal Strength/Tone] : grossly normal strength/tone [Coordination Grossly Intact] : coordination grossly intact [No Focal Deficits] : no focal deficits [Normal Gait] : normal gait [Normal Affect] : the affect was normal [Normal Insight/Judgement] : insight and judgment were intact [de-identified] : ostomy bag intact, with liquid stool

## 2023-07-21 NOTE — ASSESSMENT
[FreeTextEntry1] : Patient has been haphazard in terms of managing his pain medication, over the past 2 months. I discussed with patient, need to establish with a pain management/palliative medicine physician to manage his pain, as it would become complex as cancer progresses. Review of record from [facility] notes he refused follow up with anesthesia for pain management. Patient had bee referred to Palliative medicine in [facility] and he and his wife did not follow through. \par Patient has also rejected other options such us fentanyl patch, Extended release formulation, gabapentin, etc, stating various reasons such as he did not tolerate it in the hospital. All these could not be verified. \par At today's appointment, I made an attempt to modify patients medication to split it between ER and IR formulations, and he refused.\par We will attempt to refer patient to Palliative medication to help optimize pain medication. We discussed this with patient.

## 2023-07-22 ENCOUNTER — TRANSCRIPTION ENCOUNTER (OUTPATIENT)
Age: 48
End: 2023-07-22

## 2023-08-02 ENCOUNTER — APPOINTMENT (OUTPATIENT)
Dept: FAMILY MEDICINE | Facility: CLINIC | Age: 48
End: 2023-08-02
Payer: MEDICAID

## 2023-08-02 VITALS
SYSTOLIC BLOOD PRESSURE: 110 MMHG | OXYGEN SATURATION: 98 % | DIASTOLIC BLOOD PRESSURE: 70 MMHG | BODY MASS INDEX: 22.4 KG/M2 | WEIGHT: 169 LBS | TEMPERATURE: 98 F | HEIGHT: 73 IN | RESPIRATION RATE: 16 BRPM | HEART RATE: 77 BPM

## 2023-08-02 DIAGNOSIS — G89.29 LOW BACK PAIN, UNSPECIFIED: ICD-10-CM

## 2023-08-02 DIAGNOSIS — M54.50 LOW BACK PAIN, UNSPECIFIED: ICD-10-CM

## 2023-08-02 DIAGNOSIS — R63.4 ABNORMAL WEIGHT LOSS: ICD-10-CM

## 2023-08-02 DIAGNOSIS — Z79.891 LONG TERM (CURRENT) USE OF OPIATE ANALGESIC: ICD-10-CM

## 2023-08-02 PROCEDURE — 99214 OFFICE O/P EST MOD 30 MIN: CPT

## 2023-08-02 RX ORDER — PANTOPRAZOLE 40 MG/1
40 TABLET, DELAYED RELEASE ORAL
Qty: 30 | Refills: 0 | Status: DISCONTINUED | COMMUNITY
Start: 2022-04-18 | End: 2023-08-02

## 2023-08-02 RX ORDER — OXYCODONE HYDROCHLORIDE 15 MG/1
15 TABLET ORAL 3 TIMES DAILY
Qty: 90 | Refills: 0 | Status: ACTIVE | COMMUNITY
Start: 1900-01-01 | End: 1900-01-01

## 2023-08-02 RX ORDER — MELOXICAM 15 MG/1
15 TABLET ORAL
Refills: 0 | Status: DISCONTINUED | COMMUNITY
Start: 2022-06-07 | End: 2023-08-02

## 2023-08-02 RX ORDER — SUCRALFATE 1 G/10ML
1 SUSPENSION ORAL
Refills: 0 | Status: ACTIVE | COMMUNITY
Start: 2023-08-02

## 2023-08-02 RX ORDER — NUT.TX.GLUC.INTOLER,LAC-FR,SOY
LIQUID (ML) ORAL
Qty: 60 | Refills: 3 | Status: ACTIVE | COMMUNITY
Start: 2023-08-02

## 2023-08-02 RX ORDER — ONDANSETRON 4 MG/1
4 TABLET, ORALLY DISINTEGRATING ORAL
Refills: 0 | Status: ACTIVE | COMMUNITY
Start: 2023-08-02

## 2023-08-02 NOTE — HISTORY OF PRESENT ILLNESS
[Spouse] : spouse [FreeTextEntry1] : Tah Gusman 48 yrs old male presents to discuss pain medication.  [de-identified] : Patient was invited to come in to discuss pain medication needs. Patient has had increasing pain medication requirements and multiple ED visits for pain management. I the past we had been unsuccessful at getting patient in to see palliative medicine. Patient is currently on IR oxycodone, which is not ideal for chronic pain management. He has declined attempts to change medication regimen. He admits to some signs of dependance to the point where he does not have the pain medication for some days, he becomes jittery. He is willing to see a palliative medicine physician and understands that after this next 2-week script for Oxycodone IR, I will no longer be prescribing opioid pain medications. He understands that it is imperative to establish with palliative medicine of pain management for proper pain control.  This discussion was witnessed by Omari (). Patient and wife are requesting supplemental Ensure as patient is losing weight quickly and colostomy output has increased.

## 2023-08-12 ENCOUNTER — TRANSCRIPTION ENCOUNTER (OUTPATIENT)
Age: 48
End: 2023-08-12

## 2023-09-05 ENCOUNTER — APPOINTMENT (OUTPATIENT)
Dept: ORTHOPEDIC SURGERY | Facility: CLINIC | Age: 48
End: 2023-09-05

## 2023-09-14 ENCOUNTER — APPOINTMENT (OUTPATIENT)
Dept: FAMILY MEDICINE | Facility: CLINIC | Age: 48
End: 2023-09-14

## 2024-12-04 ENCOUNTER — NON-APPOINTMENT (OUTPATIENT)
Age: 49
End: 2024-12-04

## 2024-12-04 DIAGNOSIS — C7A.8 OTHER MALIGNANT NEUROENDOCRINE TUMORS: ICD-10-CM

## 2024-12-04 DIAGNOSIS — D64.9 ANEMIA, UNSPECIFIED: ICD-10-CM

## 2024-12-04 DIAGNOSIS — I10 ESSENTIAL (PRIMARY) HYPERTENSION: ICD-10-CM

## 2024-12-04 DIAGNOSIS — N18.4 CHRONIC KIDNEY DISEASE, STAGE 4 (SEVERE): ICD-10-CM

## 2024-12-04 RX ORDER — MELOXICAM 5 MG/1
5 CAPSULE ORAL DAILY
Refills: 0 | Status: ACTIVE | COMMUNITY

## 2025-05-22 NOTE — HISTORY OF PRESENT ILLNESS
Home [FreeTextEntry8] : Mr. MCKENZIE WAN is a pleasant 46 year old male with PMH of liver mets who normally follows up with Dr Aranda. Patient comes to the office for pain in his left wrist. Patient woke up 3 days ago with a swollen hand and wrist, thought the day he developed moderate pain. Patient went to [facility], was told that has a fracture and got it immobilized/casted. Patient was told to follow up with his PCP. He doesn't bring the hospital records. \par Patient denies worsening pain, denies recent trauma, history of frequent sudden fractures. Reports that goes to the gym and he came back from [place], drove for about 16 hrs the day before his symptoms started.\par No other complaints\par